# Patient Record
Sex: MALE | Race: WHITE
[De-identification: names, ages, dates, MRNs, and addresses within clinical notes are randomized per-mention and may not be internally consistent; named-entity substitution may affect disease eponyms.]

---

## 2017-10-05 ENCOUNTER — HOSPITAL ENCOUNTER (OUTPATIENT)
Dept: HOSPITAL 53 - M SFHCPLAZ | Age: 37
End: 2017-10-05
Attending: INTERNAL MEDICINE
Payer: COMMERCIAL

## 2017-10-05 DIAGNOSIS — Z00.00: Primary | ICD-10-CM

## 2017-10-05 LAB
ADD MORPHOLOGY?: NO
ALBUMIN SERPL BCG-MCNC: 4.1 GM/DL (ref 3.2–5.2)
ALBUMIN/GLOB SERPL: 1.41 {RATIO} (ref 1–1.93)
ALP SERPL-CCNC: 63 U/L (ref 45–117)
ALT SERPL W P-5'-P-CCNC: 24 U/L (ref 12–78)
ANION GAP SERPL CALC-SCNC: 5 MEQ/L (ref 8–16)
AST SERPL-CCNC: 10 U/L (ref 15–37)
BASOPHILS # BLD AUTO: 0.1 10^3/UL (ref 0–0.2)
BASOPHILS NFR BLD AUTO: 1 % (ref 0–1)
BILIRUB SERPL-MCNC: 0.3 MG/DL (ref 0.2–1)
BUN SERPL-MCNC: 15 MG/DL (ref 7–18)
CALCIUM SERPL-MCNC: 8.5 MG/DL (ref 8.5–10.1)
CHLORIDE SERPL-SCNC: 105 MEQ/L (ref 98–107)
CHOLEST SERPL-MCNC: 169 MG/DL (ref ?–200)
CO2 SERPL-SCNC: 32 MEQ/L (ref 21–32)
CREAT SERPL-MCNC: 1.09 MG/DL (ref 0.7–1.3)
EOSINOPHIL # BLD AUTO: 0.1 10^3/UL (ref 0–0.5)
EOSINOPHIL NFR BLD AUTO: 1.6 % (ref 0–3)
ERYTHROCYTE [DISTWIDTH] IN BLOOD BY AUTOMATED COUNT: 12.1 % (ref 11.5–14.5)
EST. AVERAGE GLUCOSE BLD GHB EST-MCNC: 117 MG/DL (ref 60–110)
GFR SERPL CREATININE-BSD FRML MDRD: > 60 ML/MIN/{1.73_M2} (ref 60–?)
GLUCOSE SERPL-MCNC: 78 MG/DL (ref 70–105)
IMM GRANULOCYTES NFR BLD: 0.3 % (ref 0–0)
LYMPHOCYTES # BLD AUTO: 2.8 10^3/UL (ref 1.5–4.5)
LYMPHOCYTES NFR BLD AUTO: 40.3 % (ref 24–44)
MCH RBC QN AUTO: 30.5 PG (ref 27–33)
MCHC RBC AUTO-ENTMCNC: 32.8 G/DL (ref 32–36.5)
MCV RBC AUTO: 92.9 FL (ref 80–96)
MONOCYTES # BLD AUTO: 0.7 10^3/UL (ref 0–0.8)
MONOCYTES NFR BLD AUTO: 9.4 % (ref 0–5)
NEUTROPHILS # BLD AUTO: 3.3 10^3/UL (ref 1.8–7.7)
NEUTROPHILS NFR BLD AUTO: 47.4 % (ref 36–66)
NRBC BLD AUTO-RTO: 0 % (ref 0–0)
PLATELET # BLD AUTO: 277 10^3/UL (ref 150–450)
POTASSIUM SERPL-SCNC: 4.2 MEQ/L (ref 3.5–5.1)
PROT SERPL-MCNC: 7 GM/DL (ref 6.4–8.2)
SODIUM SERPL-SCNC: 142 MEQ/L (ref 136–145)
TRIGL SERPL-MCNC: 137 MG/DL (ref ?–150)
WBC # BLD AUTO: 7 10^3/UL (ref 4–10)

## 2018-06-14 ENCOUNTER — HOSPITAL ENCOUNTER (OUTPATIENT)
Dept: HOSPITAL 53 - M OPP | Age: 38
Discharge: HOME | End: 2018-06-14
Attending: INTERNAL MEDICINE
Payer: COMMERCIAL

## 2018-06-14 DIAGNOSIS — F41.9: ICD-10-CM

## 2018-06-14 DIAGNOSIS — Z12.11: Primary | ICD-10-CM

## 2018-06-14 DIAGNOSIS — Z80.3: ICD-10-CM

## 2018-06-14 DIAGNOSIS — K21.9: ICD-10-CM

## 2018-06-14 DIAGNOSIS — K44.9: ICD-10-CM

## 2018-06-14 DIAGNOSIS — Z80.0: ICD-10-CM

## 2018-06-14 DIAGNOSIS — Z79.899: ICD-10-CM

## 2018-06-14 DIAGNOSIS — D12.5: ICD-10-CM

## 2018-06-14 DIAGNOSIS — K57.30: ICD-10-CM

## 2018-06-14 PROCEDURE — 45385 COLONOSCOPY W/LESION REMOVAL: CPT

## 2018-06-19 ENCOUNTER — HOSPITAL ENCOUNTER (EMERGENCY)
Dept: HOSPITAL 53 - M ED | Age: 38
Discharge: HOME | End: 2018-06-19
Payer: COMMERCIAL

## 2018-06-19 DIAGNOSIS — Z79.899: ICD-10-CM
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
## 2018-06-19 DIAGNOSIS — K21.9: ICD-10-CM

## 2018-06-19 DIAGNOSIS — S90.31XA: Primary | ICD-10-CM

## 2018-06-19 DIAGNOSIS — F32.9: ICD-10-CM

## 2018-06-19 PROCEDURE — 73610 X-RAY EXAM OF ANKLE: CPT

## 2020-05-28 ENCOUNTER — HOSPITAL ENCOUNTER (OUTPATIENT)
Dept: HOSPITAL 53 - M SFHCPLAZ | Age: 40
End: 2020-05-28
Attending: INTERNAL MEDICINE
Payer: COMMERCIAL

## 2020-05-28 DIAGNOSIS — Z13.1: Primary | ICD-10-CM

## 2020-05-28 DIAGNOSIS — F41.9: ICD-10-CM

## 2020-05-28 LAB
APPEARANCE UR: CLEAR
BACTERIA UR QL AUTO: NEGATIVE
BILIRUB UR QL STRIP.AUTO: NEGATIVE
CHOLEST SERPL-MCNC: 187 MG/DL (ref ?–200)
CHOLEST/HDLC SERPL: 3.34 {RATIO} (ref ?–5)
CREAT UR-MCNC: 179 MG/DL
EST. AVERAGE GLUCOSE BLD GHB EST-MCNC: 108 MG/DL (ref 60–110)
GLUCOSE UR QL STRIP.AUTO: NEGATIVE MG/DL
HDLC SERPL-MCNC: 56 MG/DL (ref 40–?)
HGB UR QL STRIP.AUTO: NEGATIVE
KETONES UR QL STRIP.AUTO: NEGATIVE MG/DL
LDLC SERPL CALC-MCNC: 97 MG/DL (ref ?–100)
LEUKOCYTE ESTERASE UR QL STRIP.AUTO: NEGATIVE
MICROALBUMIN UR-MCNC: 7.5 MG/L
MICROALBUMIN/CREAT UR: 4.1 MCG/MG (ref 0–30)
MUCOUS THREADS URNS QL MICRO: (no result)
NITRITE UR QL STRIP.AUTO: NEGATIVE
NONHDLC SERPL-MCNC: 131 MG/DL
PH UR STRIP.AUTO: 5 UNITS (ref 5–9)
PROT UR QL STRIP.AUTO: NEGATIVE MG/DL
RBC # UR AUTO: 0 /HPF (ref 0–3)
SP GR UR STRIP.AUTO: 1.02 (ref 1–1.03)
SQUAMOUS #/AREA URNS AUTO: 0 /HPF (ref 0–6)
TRIGL SERPL-MCNC: 169 MG/DL (ref ?–150)
TSH SERPL DL<=0.005 MIU/L-ACNC: 1.85 UIU/ML (ref 0.36–3.74)
UROBILINOGEN UR QL STRIP.AUTO: 2 MG/DL (ref 0–2)
WBC #/AREA URNS AUTO: 1 /HPF (ref 0–3)

## 2020-12-17 ENCOUNTER — HOSPITAL ENCOUNTER (OUTPATIENT)
Dept: HOSPITAL 53 - M SFHCPLAZ | Age: 40
End: 2020-12-17
Attending: INTERNAL MEDICINE
Payer: COMMERCIAL

## 2020-12-17 DIAGNOSIS — R82.90: ICD-10-CM

## 2020-12-17 DIAGNOSIS — Z00.00: Primary | ICD-10-CM

## 2020-12-17 LAB
APPEARANCE UR: CLEAR
BACTERIA UR QL AUTO: NEGATIVE
BILIRUB UR QL STRIP.AUTO: NEGATIVE
GLUCOSE UR QL STRIP.AUTO: NEGATIVE MG/DL
HCV AB SER QL: 0.1 INDEX (ref ?–0.8)
HGB UR QL STRIP.AUTO: NEGATIVE
HIV 1+2 AB+HIV1 P24 AG SERPL QL IA: NEGATIVE
KETONES UR QL STRIP.AUTO: NEGATIVE MG/DL
LEUKOCYTE ESTERASE UR QL STRIP.AUTO: NEGATIVE
MUCOUS THREADS URNS QL MICRO: (no result)
NITRITE UR QL STRIP.AUTO: NEGATIVE
PH UR STRIP.AUTO: 5 UNITS (ref 5–9)
PROT UR QL STRIP.AUTO: NEGATIVE MG/DL
RBC # UR AUTO: 0 /HPF (ref 0–3)
SP GR UR STRIP.AUTO: 1.02 (ref 1–1.03)
SQUAMOUS #/AREA URNS AUTO: 0 /HPF (ref 0–6)
UROBILINOGEN UR QL STRIP.AUTO: 0.2 MG/DL (ref 0–2)
WBC #/AREA URNS AUTO: 0 /HPF (ref 0–3)

## 2021-10-18 ENCOUNTER — HOSPITAL ENCOUNTER (EMERGENCY)
Dept: HOSPITAL 53 - M ED | Age: 41
Discharge: LEFT BEFORE DISCHARGE | End: 2021-10-18
Payer: COMMERCIAL

## 2021-10-18 VITALS — SYSTOLIC BLOOD PRESSURE: 149 MMHG | DIASTOLIC BLOOD PRESSURE: 93 MMHG

## 2021-10-18 VITALS — HEIGHT: 69 IN | BODY MASS INDEX: 31.74 KG/M2 | WEIGHT: 214.29 LBS

## 2021-10-18 DIAGNOSIS — Z53.29: Primary | ICD-10-CM

## 2021-10-18 NOTE — CCD
Continuity of Care Document (CCD)

                             Created on: 08/10/2021



Maverick Dee

External Reference #: [id number]

: 1980

Sex: Male



Demographics





                          Address                   [address] [place]  [address]

 

                          Home Phone                [phone]

 

                          Preferred Language        Unknown

 

                          Marital Status            Unknown

 

                          Worship Affiliation     Unknown

 

                          Race                      White

 

                          Ethnic Group              Not  or 





Author





                          Author                    [facility]

 

                          Organization              Unknown

 

                          Address                   [address] [place] [address]  [address]



 

                          Phone                     [phone]







Care Team Providers





                    Care Team Member Name Role                Phone

 

                    [facility]                [phone]







Problems





                                        Description

 

                                        No Information Available







Social History





                Type            Date            Description     Comments

 

                Birth Sex                       Unknown          

 

                ETOH Use                        Occasionally consumes alcohol  

 

                Tobacco Use     Start: Unknown End: Unknown Patient is a former 

smoker currently 

trying to quit, 9 days since last cigarette.  

 

                Tobacco Use     Start: Unknown  The Patient Has Never Vaped  







Allergies, Adverse Reactions, Alerts





                                        Description

 

                                        No Known Drug Allergies







Medications





           Active Medications SIG        Qnty       Indications Ordering Provide

r Date

 

                                        Amoxicillin/Clavulanate Potassium       

              875-125mg Tablets         

                1 tab by mouth twice a day for 7 days 14tabs          J20.9     

      Abilio Rodriguez JR., M.D.                               08/10/2021

 

                          Prednisone                     20mg Tablets           

        1 tab by mouth 

twice a day for 4 days 8tabs           J20.9           YANELIS Blas JR. 08/10/2021

 

                                        Albuterol Sulfate HFA                   

  108(90Base) mcg/Act Aerosol           

                2 puffs inhaled every 4-6 hours as needed for sob/wheeze 8.500gm

         J20.9           

Abilio Rodriguez JR., M.D.            08/10/2021

 

           Lexapro                                     Unknown    

 

           Prilosec OTC                                  Unknown    







Immunizations





                                        Description

 

                                        No Information Available







Vital Signs





                Date            Vital           Result          Comment

 

                08/10/2021 10:07am BP Systolic     106 mmHg         

 

                    BP Diastolic        72 mmHg              

 

                    Heart Rate          68 /min              

 

                    Respiratory Rate    18 /min              

 

                    O2 % BldC Oximetry  98 %                 

 

                    Body Temperature    98.6 F             

 

                    Weight              221.00 lb            

 

                    Height              69 inches           5'9"

 

                    BMI (Body Mass Index) 32.6 kg/m2           

 

                    Pain Level          3                    







Results





                                        Description

 

                                        No Information Available







Procedures





                Date            Code            Description     Status

 

                08/10/2021      85171           Office/Outpatient New Low MDM 30

-44 Minutes Completed







Medical Devices





                                        Description

 

                                        No Information Available







Encounters





           Type       Date       Location   Provider   Dx         Diagnosis

 

           Office Visit 08/10/2021  9:10a Main Office KANG Kenyon J2

0.9      Acute 

bronchitis, unspecified

 

                          Z20.828                   Contact w and exposure to ot

h viral communicable diseases







Assessments





                Date            Code            Description     Provider

 

                08/10/2021      J20.9           Acute bronchitis, unspecified KANG Santos

 

                    08/10/2021          Z20.828             Contact with and (arauz

spected) exposure to other viral 

communicable diseases                   KANG Kenyon







Plan of Treatment





No Information Available



Functional Status





                                        Description

 

                                        No Information Available







Mental Status





                                        Description

 

                                        No Information Available







Referrals





                                        Description

 

                                        No Information Available

## 2021-10-18 NOTE — CCD
Continuity of Care Document (CCD)

                             Created on: 08/10/2021



Maverick Dee

External Reference #: [id number]

: 1980

Sex: Male



Demographics





                          Address                   [address] [place]  [address]

 

                          Home Phone                [phone]

 

                          Preferred Language        Unknown

 

                          Marital Status            Unknown

 

                          Yazidism Affiliation     Unknown

 

                          Race                      White

 

                          Ethnic Group              Not  or 





Author





                          Author                    [facility]

 

                          Organization              Unknown

 

                          Address                   [address] [place] [address]  [address]



 

                          Phone                     [phone]







Care Team Providers





                    Care Team Member Name Role                Phone

 

                    [facility]                [phone]







Problems





                                        Description

 

                                        No Information Available







Social History





                Type            Date            Description     Comments

 

                Birth Sex                       Unknown          

 

                ETOH Use                        Occasionally consumes alcohol  

 

                Tobacco Use     Start: Unknown End: Unknown Patient is a former 

smoker currently 

trying to quit, 9 days since last cigarette.  

 

                Tobacco Use     Start: Unknown  The Patient Has Never Vaped  







Allergies, Adverse Reactions, Alerts





                                        Description

 

                                        No Known Drug Allergies







Medications





           Active Medications SIG        Qnty       Indications Ordering Provide

r Date

 

                                        Amoxicillin/Clavulanate Potassium       

              875-125mg Tablets         

                1 tab by mouth twice a day for 7 days 14tabs          J20.9     

      Abilio Rodriguez JR., M.D.                               08/10/2021

 

                          Prednisone                     20mg Tablets           

        1 tab by mouth 

twice a day for 4 days 8tabs           J20.9           YANELIS Blas JR. 08/10/2021

 

                                        Albuterol Sulfate HFA                   

  108(90Base) mcg/Act Aerosol           

                2 puffs inhaled every 4-6 hours as needed for sob/wheeze 8.500gm

         J20.9           

Abilio Rodriguez JR., M.D.            08/10/2021

 

           Lexapro                                     Unknown    

 

           Prilosec OTC                                  Unknown    







Immunizations





                                        Description

 

                                        No Information Available







Vital Signs





                Date            Vital           Result          Comment

 

                08/10/2021 10:07am BP Systolic     106 mmHg         

 

                    BP Diastolic        72 mmHg              

 

                    Heart Rate          68 /min              

 

                    Respiratory Rate    18 /min              

 

                    O2 % BldC Oximetry  98 %                 

 

                    Body Temperature    98.6 F             

 

                    Weight              221.00 lb            

 

                    Height              69 inches           5'9"

 

                    BMI (Body Mass Index) 32.6 kg/m2           

 

                    Pain Level          3                    







Results





                                        Description

 

                                        No Information Available







Procedures





                Date            Code            Description     Status

 

                08/10/2021      22117           Office/Outpatient New Low MDM 30

-44 Minutes Completed







Medical Devices





                                        Description

 

                                        No Information Available







Encounters





           Type       Date       Location   Provider   Dx         Diagnosis

 

           Office Visit 08/10/2021  9:10a Main Office KANG Kenyon J2

0.9      Acute 

bronchitis, unspecified

 

                          Z20.828                   Contact w and exposure to ot

h viral communicable diseases







Assessments





                Date            Code            Description     Provider

 

                08/10/2021      J20.9           Acute bronchitis, unspecified KANG Santos

 

                    08/10/2021          Z20.828             Contact with and (arauz

spected) exposure to other viral 

communicable diseases                   KANG Kenyon







Plan of Treatment





No Information Available



Functional Status





                                        Description

 

                                        No Information Available







Mental Status





                                        Description

 

                                        No Information Available







Referrals





                                        Description

 

                                        No Information Available

## 2021-10-18 NOTE — CCD
Continuity of Care Document (CCD)

                             Created on: 2021



Maverick Dee

External Reference #: [id number]

: 1980

Sex: Male



Demographics





                          Address                   [address] [place]  [address]

 

                          Home Phone                [phone]

 

                          Preferred Language        Unknown

 

                          Marital Status            Unknown

 

                          Sikhism Affiliation     Unknown

 

                          Race                      White

 

                          Ethnic Group              Not  or 





Author





                          Author                    [facility]

 

                          Organization              Unknown

 

                          Address                   [address], [address]

[place]  [address]



 

                          Phone                     [phone]







Care Team Providers





                    Care Team Member Name Role                Phone

 

                    Tony Licea M.D. [facility]                [phone]

 

                    Wagner Warren D.O. AUT[facility]                [phone]







Problems





                                        Description

 

                                        No Active Problems







Social History





                Type            Date            Description     Comments

 

                Birth Sex                       Unknown          

 

                ETOH Use                        2-6 drinks 1-3 times per week  

 

                Tobacco Use     Start: Unknown  Non Smoker       







Allergies, Adverse Reactions, Alerts





                                        Description

 

                                        No Known Drug Allergies







Medications





           Active Medications SIG        Qnty       Indications Ordering Provide

r Date

 

                          Sutab                     7890-730-867ym Tablets      

             take tablets 

as directed per doctor for bowel prep. 1kit            Z12.11          Rusty colón MD 2021

 

                          Dulcolax                     5mg Tablets DR           

        take 4 tabs by 

mouth prior to procedure per instructions. 4tabs           Z12.11          Rusty Araujo MD 

2021

 

                          Pantoprazole Sodium                     20mg Tablets D

R                   1/2 

tablet every other day                                 Unknown         

0

 

                          Citalopram Hydrobromide                     10mg Table

ts                   Daily

                                                Unknown         







Immunizations





                                        Description

 

                                        No Information Available







Vital Signs





                Date            Vital           Result          Comment

 

                2021  8:07am BP Systolic     120 mmHg         

 

                    BP Diastolic        82 mmHg              

 

                    Height              69 inches           5'9"

 

                    Weight              218.00 lb            

 

                    BMI (Body Mass Index) 32.2 kg/m2           

 

                    Ideal Body Weight   160 lb               

 

                    Weight              98.885 kg            

 

                    BSA (Body Surface Area) 2.14 m2              

 

                2018  1:06pm BP Systolic     108 mmHg         

 

                    BP Diastolic        80 mmHg              

 

                    Height              69 inches           5'9"

 

                    Weight              219.00 lb            

 

                    BMI (Body Mass Index) 32.3 kg/m2           

 

                    Ideal Body Weight   160 lb               

 

                    Weight              99.338 kg            

 

                    BSA (Body Surface Area) 2.15 m2              







Results





                                        Description

 

                                        No Information Available







Procedures





                                        Description

 

                                        No Information Available







Medical Devices





                                        Description

 

                                        No Information Available







Encounters





                                        Description

 

                                        No Information Available







Assessments





                Date            Code            Description     Provider

 

                2021      Z12.11          Encounter for screening for rosa

gnant neoplasm of colon 

Destineecheryle ScottALMA

 

                2021      Z86.010         Personal history of colonic poly

ps Destinee A ALMA Scott

 

                2021      Z80.0           Family history of malignant neop

lasm of digestive organs 

Destineecheryle AcostaALMA lovett

 

                2021      K21.9           Gastro-esophageal reflux disease

 without esophagitis Destinee A 

ALMA Scott







Plan of Treatment

2021 - Destinee A ALMA Scott* Z12.11 Encounter for screening for 
  malignant neoplasm of colon

* Z86.010 Personal history of colonic polyps

* Z80.0 Family history of malignant neoplasm of digestive organs

* K21.9 Gastro-esophageal reflux disease without esophagitis

* * New Medication:* Sutab 8436-605-527 mg

* Dulcolax 5 mg



* New Orders:* Colonoscopy, Ordered: 21



* Comments:* Will arrange for upper endoscopy and colonoscopy. Reviewed risks 
  and benefits of the procedures, as well as other options, with the patient. 
  Prep for this procedure was discussed with patient, including risks and side 
  effects associated with the prep. Patient verbalized understanding of all of 
  the above and is in agreement to proceed. Patient will seek medical attention 
  for any acute changes. Will monitor.



* Follow up:* As scheduled, sooner if needed.









Functional Status





                                        Description

 

                                        No Information Available







Mental Status





                                        Description

 

                                        No Information Available







Referrals





                                        Description

 

                                        No Information Available

## 2021-10-18 NOTE — CCD
Summarization of Episode Note

                             Created on: 2021



STEPHY CHRISTINA HILARIO

External Reference #: [id number]

: 1980

Sex: Male



Demographics





                          Address                   [address] [place]  [address]

 

                          Home Phone                [phone]

 

                          Preferred Language        Unknown

 

                          Marital Status            Unknown

 

                          Nondenominational Affiliation     Unknown

 

                          Race                      White

 

                          Ethnic Group              Not  or 





Author





                          Author                    [facility]

ems

 

                          Organization              [facility]

ems

 

                          Address                   Unknown

 

                          Phone                     Unavailable







Support





                Name            Relationship    Address         Phone

 

                    CHRISTINA KEYES                [address], [place]  [address] [phone]

 

                    RayaAna rincon          [employer]                [address] [place]  [address]                  [phone]







Care Team Providers





                    Care Team Member Name Role                Phone

 

                    Wagner Warren   Unavailable         [phone]







PROBLEMS





          Type      Condition ICD9-CM Code NKV09-AQ Code Onset Dates Condition S

tatus W/U 

Status              Risk                SNOMED Code         Notes

 

       Problem Hiatal hernia 553.3                Active confirmed        317376

09  

 

       Problem Anxiety 300.00               Active confirmed        73466179  

 

          Problem   Diaphragmatic hernia without obstruction or gangrene        

   K44.9               Active    

confirmed                               [id number]             

 

       Problem Cardiac risk counseling V65.49               Active confirmed    

    [id number]  

 

          Problem   Melanocytic nevi of right lower limb, including hip         

  D22.71              Active    

confirmed                               [id number]            

 

          Problem   Melanocytic nevi of left lower limb, including hip          

 D22.72              Active    

confirmed                               [id number]      

 

       Problem Anxiety with depression        F41.8         Active confirmed    

    058861284  

 

       Problem BMI 31.0-31.9,adult        Z68.31        Active confirmed        

823401533  

 

       Problem Anxiety        F41.9         Active confirmed        30737843  

 

       Problem Anorgasmia of male        F52.32        Active confirmed        8

4170129  

 

       Problem Cyst of skin        L72.9         Active confirmed        3813953

01  

 

       Problem BMI 30.0-30.9,adult        Z68.30        Active confirmed        

270811510  

 

       Problem Hidradenitis suppurativa        L73.2         Active confirmed   

     96746945  

 

       Problem Lentigines        L81.4         Active confirmed        498162997

  

 

           Problem    Melanocytic nevi of left upper limb, including shoulder   

         D22.62                

Active          confirmed                       [id number]  

 

           Problem    Melanocytic nevi of right upper limb, including shoulder  

          D22.61                

Active          confirmed                       169277765        

 

       Problem Melanocytic nevi of trunk        D22.5         Active confirmed  

      650017307  

 

       Problem Folliculitis        L73.9         Active confirmed        1206843

6  

 

                Problem         Gastroesophageal reflux disease, esophagitis pre

sence not specified                 

K21.9                 Active     confirmed             853527308   

 

                          Problem                   Gastroesophageal reflux dise

ase, unspecified whether esophagitis present

                K21.9           Active  confirmed         510487164  

 

       Problem Anxiety disorder, unspecified        F41.9         Active confirm

ed        449992263  

 

                          Problem                   Major depressive disorder in

 partial remission, unspecified whether 

recurrent         F32.4           Active  confirmed         39398250  

 

        Problem Melanocytic nevi of scalp and neck         D22.4           Activ

e  confirmed         

547043293                                

 

       Problem Cherry angioma        D18.01        Active confirmed        10175

01  

 

       Problem Ephelides        L81.2         Active confirmed        194771549 

 

 

       Problem Dermatofibroma        D23.9         Active confirmed        62976

6000  







ALLERGIES

No Known Allergies



ENCOUNTERS from 1980 to 2021





             Encounter    Location     Date         Provider     Diagnosis

 

                          [facility] Resident         [address] 

[phone] [place] [address]

                            Wagner Warren      







IMMUNIZATIONS





                Vaccine         Route           Administration Date Status

 

                Influenza 18 yrs & older Flublok IM Intramuscular Dec 07, 2020  

  Administered

 

                Influenza 18 yrs & older Flublok IM Intramuscular 2020

  Administered

 

                Pneumococcal Adult 0.5mL Pneumovax 23 IM Intramuscular 2020  

Administered

 

                Influenza 6mo & up Fluzone IM Intramuscular Oct 18, 2018    Admi

nistered

 

                Influenza 6mo & up Fluzone IM Intramuscular Oct 05, 2017    Admi

nistered







SOCIAL HISTORY

Tobacco Use:



                    Social History Observation Description         Date

 

                    Details (start date - stop date) Current Smoker       



Sex Assigned At Birth:



                          Social History Observation Description

 

                          Sex Assigned At Birth     Unknown



Education:



                    Question            Answer              Notes

 

                    Level of Education: College              



Audit



                    Question            Answer              Notes

 

                    Total Score:        1                    

 

                    Interpretation:     Alcohol Education    



Language:



                    Question            Answer              Notes

 

                    Languages spoken:   English              



Pentecostal:



                    Question            Answer              Notes

 

                    Pentecostal                                No Anabaptist beliefs

 that would impact health care.



Sexual Hx:



                    Question            Answer              Notes

 

                    Had sex in the last 12 months (vaginal, oral, or anal)? Yes 

                 

 

                    Have you ever had an STD? No                   

 

                    with                Women only           

 

                    Use protection?     No                   



Drug and Alcohol



                    Question            Answer              Notes

 

                    Total Score:        0                    

 

                    Interpretation:     No problems reported  



Alcohol Screening:



                    Question            Answer              Notes

 

                    Did you have a drink containing alcohol in the past year? Ye

s                  

 

                    Points              7                    

 

                    Interpretation      Positive             

 

                          How often did you have six or more drinks on one occas

ion in the past year? 

Monthly (2 points)                       

 

                                        How many drinks did you have on a typica

l day when you were drinking in the past

year?                     7 to 9 (3 points)          

 

                          How often did you have a drink containing alcohol in t

he past year? Two to four 

times a month (2 points)                 



BMI Care Goal Follow-Up



                    Question            Answer              Notes

 

                    Above Normal BMI Follow-Up Lifestyle education regarding die

t  



Tobacco Use:



                    Question            Answer              Notes

 

                    Are you a:          current smoker       







REASON FOR REFERRAL

No Information



VITAL SIGNS

No information



MEDICATIONS





           Medication SIG (Take, Route, Frequency, Duration) Notes      Start Da

te End Date   

Status

 

                          Doxycycline Hyclate 100 MG 1 capsule Orally Twice a da

y with food and water for 

30 days                         27 Oct, 2020                    Not-Taking

 

                          Clindamycin Phosphate 1 % 1 application Externally Onc

e a day to areas with acne

like bumps after washing with Hibiclens for 30 days                 27 Oct, 2020

                    Active

 

           Lexapro 10 mg 1 cap(s) orally daily for 30 days            15 Mauro, 20

15            Not-Taking

 

           Protonix 20 MG 1 tablet Orally Once a day for 30 day(s)                        Active

 

                Citalopram Hydrobromide 10 MG 1 tablet Orally Once a day for 90 

day(s)                                                                 Active







PROCEDURES

No Information



RESULTS

No Results



REASON FOR VISIT

no showed



MEDICAL (GENERAL) HISTORY





                    Type                Description         Date

 

                    Medical History     Generalized anxiety disorder  

 

                    Medical History     Gastroesophageal reflux disease  

 

                    Surgical History    tonsillectomy & adenoids  

 

                    Surgical History    pilonial cyst       

 

                    Surgical History    EGD                 2015

 

                    Hospitalization History related to surgery   

 

                    Hospitalization History Admitted dehydration  

 

                    Hospitalization History Admitted MH          







Goals Section

No Information



Health Concerns

No Information



MEDICAL EQUIPMENT

No Information



MENTAL STATUS

No Information



FUNCTIONAL STATUS

No Information



ASSESSMENTS

No Information



PLAN OF TREATMENT

Medication



                Medication Name Sig             Start Date      Stop Date

 

                    Citalopram Hydrobromide 10 MG 1 tablet Orally Once a day for

 90 day(s)                                      

 

                Protonix 20 MG  1 tablet Orally Once a day for 30 day(s)      



Next Appt



                                        Details

 

                                        Provider Name:Michelle Loyd, 2021-10-

29 08:15:00 AM, [address] [place] [address], 

[phone], [place], [place], [facility], [phone]







Insurance Providers





             Payer Name   Payer Address Payer Phone  Insured Name Patient Relati

onship to 

Insured                   Coverage Start Date       Coverage End Date

 

                [facility] [address]  [facility] [phone]

[phone]    

CHRISTINA KEYES        self

## 2021-10-18 NOTE — CCD
Continuity of Care Document (CCD)

                             Created on: 08/10/2021



Maverick Dee

External Reference #: [id number]

: 1980

Sex: Male



Demographics





                          Address                   [address] [place]  [address]

 

                          Home Phone                [phone]

 

                          Preferred Language        Unknown

 

                          Marital Status            Unknown

 

                          Presybeterian Affiliation     Unknown

 

                          Race                      White

 

                          Ethnic Group              Not  or 





Author





                          Author                    [facility]

 

                          Organization              Unknown

 

                          Address                   [address] [place] [address]  [address]



 

                          Phone                     [phone]







Care Team Providers





                    Care Team Member Name Role                Phone

 

                    [facility]                [phone]







Problems





                                        Description

 

                                        No Information Available







Social History





                Type            Date            Description     Comments

 

                Birth Sex                       Unknown          

 

                ETOH Use                        Occasionally consumes alcohol  

 

                Tobacco Use     Start: Unknown End: Unknown Patient is a former 

smoker currently 

trying to quit, 9 days since last cigarette.  

 

                Tobacco Use     Start: Unknown  The Patient Has Never Vaped  







Allergies, Adverse Reactions, Alerts





                                        Description

 

                                        No Known Drug Allergies







Medications





           Active Medications SIG        Qnty       Indications Ordering Provide

r Date

 

                                        Amoxicillin/Clavulanate Potassium       

              875-125mg Tablets         

                1 tab by mouth twice a day for 7 days 14tabs          J20.9     

      Abilio Rodriguez JR., M.D.                               08/10/2021

 

                          Prednisone                     20mg Tablets           

        1 tab by mouth 

twice a day for 4 days 8tabs           J20.9           YANELIS Blas JR. 08/10/2021

 

                                        Albuterol Sulfate HFA                   

  108(90Base) mcg/Act Aerosol           

                2 puffs inhaled every 4-6 hours as needed for sob/wheeze 8.500gm

         J20.9           

Abilio Rodriguez JR., M.D.            08/10/2021

 

           Lexapro                                     Unknown    

 

           Prilosec OTC                                  Unknown    







Immunizations





                                        Description

 

                                        No Information Available







Vital Signs





                Date            Vital           Result          Comment

 

                08/10/2021 10:07am BP Systolic     106 mmHg         

 

                    BP Diastolic        72 mmHg              

 

                    Heart Rate          68 /min              

 

                    Respiratory Rate    18 /min              

 

                    O2 % BldC Oximetry  98 %                 

 

                    Body Temperature    98.6 F             

 

                    Weight              221.00 lb            

 

                    Height              69 inches           5'9"

 

                    BMI (Body Mass Index) 32.6 kg/m2           

 

                    Pain Level          3                    







Results





                                        Description

 

                                        No Information Available







Procedures





                Date            Code            Description     Status

 

                08/10/2021      25837           Office/Outpatient New Low MDM 30

-44 Minutes Completed







Medical Devices





                                        Description

 

                                        No Information Available







Encounters





           Type       Date       Location   Provider   Dx         Diagnosis

 

           Office Visit 08/10/2021  9:10a Main Office KANG Kenyon J2

0.9      Acute 

bronchitis, unspecified

 

                          Z20.828                   Contact w and exposure to ot

h viral communicable diseases







Assessments





                Date            Code            Description     Provider

 

                08/10/2021      J20.9           Acute bronchitis, unspecified KANG Santos

 

                    08/10/2021          Z20.828             Contact with and (arauz

spected) exposure to other viral 

communicable diseases                   KANG Kenyon







Plan of Treatment





No Information Available



Functional Status





                                        Description

 

                                        No Information Available







Mental Status





                                        Description

 

                                        No Information Available







Referrals





                                        Description

 

                                        No Information Available

## 2021-10-18 NOTE — CCD
Summarization Of Episode

                             Created on: 10/18/2021



CHRISTINA KEYES

External Reference #: [id number]

: 1980

Sex: Undifferentiated



Demographics





                          Address                   [address] [place]  [address]

 

                          Home Phone                [phone]

 

                          Preferred Language        English

 

                          Marital Status            Unknown

 

                          Sabianism Affiliation     Unknown

 

                          Race                      Unknown

 

                          Ethnic Group              Not  or 





Author





                          Author                    [facility]

 

                          Organization              [facility]

 

                          Address                   Unknown

 

                          Phone                     Unavailable







Support





                Name            Relationship    Address         Phone

 

                    INDRVCTL            Next Of [facility] [address]

[place]  [address]                 [phone]

 

                    [place]  [address]                  [phone]

 

                    [facility] Next Of Kin         [address]

[place]  [address]                 [phone]

 

                    SHARAD ADAMS         Next Of [facility]         [address] [place]  [address]                  [phone]

 

                    INDIANRIV*          Next Of Kin         [address]

[place]  [address]                 [phone]

 

                              Next Of Kin     Unknown         Unavailable

 

                    POONAM KEYES    Next Of Kin         [address] [place]  [address]                  [phone]

 

                    MARILEE KEYES   Next Of Kin         [address] [place]  [address]                      [phone]

 

                    Sharad Adams         ECON                [address] [place]  [address]                  [phone]







Care Team Providers





                    Care Team Member Name Role                Phone

 

                    LADAN HARPM PA-C Unavailable         Unavailable

 

                    DJOUINI  ZAK PA-C Unavailable         Unavailable

 

                    FRANCISCOOUINI,  ZAK PA-C Unavailable         Unavailable

 

                    DJOUINI  ZAK PA-C Unavailable         Unavailable

 

                    DJOUINI,  ZAK PA-C Unavailable         Unavailable

 

                    DJOUINI,  ZAK PA-C Unavailable         Unavailable

 

                    RUTHANNGABRIELA TONY PA Unavailable         Unavailable

 

                    RUTHANNGABRIELA PA Unavailable         Unavailable

 

                    RUTHANNGABRIELA TONY PA Unavailable         Unavailable

 

                    RUTHANNGABRIELA TONY PA Unavailable         Unavailable

 

                    RUTHANNGABRIELA PA Unavailable         Unavailable

 

                    RUTHANNGABRIELA PA Unavailable         Unavailable

 

                    RUTHANNRAMYAGABRIELA PA Unavailable         Unavailable

 

                    RUTHANNGABRIELA PA Unavailable         Unavailable

 

                    RUTHANN,  GABRIELA PA Unavailable         Unavailable

 

                    RUTHANNCARMINAHAN PA Unavailable         Unavailable

 

                    RUTHANNRAMYAGABRIELA PA Unavailable         Unavailable

 

                    RUTHANN,  GABRIELA PA Unavailable         Unavailable

 

                    RUTHANN,  GABRIELA PA Unavailable         Unavailable

 

                    RUTHANN,  GABRIELA PA Unavailable         Unavailable

 

                    RUTHANN,  GABRIELA PA Unavailable         Unavailable

 

                    RUTHANN,  GABRIELA PA Unavailable         Unavailable

 

                    RUTHANN,  GABRIELA PA Unavailable         Unavailable

 

                    RUTHANN,  GABRIELA PA Unavailable         Unavailable

 

                    RUTHANN,  GABRIELA PA Unavailable         Unavailable

 

                    RUTHANN,  GABRIELA PA Unavailable         Unavailable

 

                    RUTHANN,  GABRIELA PA Unavailable         Unavailable

 

                    RUTHANN,  GABRIELA PA Unavailable         Unavailable

 

                    RUTHANN,  GABRIELA PA Unavailable         Unavailable

 

                    RUTHANN,  GABRIELA PA Unavailable         Unavailable

 

                    RUTHANN,  GABRIELA PA Unavailable         Unavailable

 

                    RUTHANN,  GABRIELA PA Unavailable         Unavailable

 

                    RUTHANN,  GABRIELA PA Unavailable         Unavailable

 

                    RUTHANN,  GABRIELA PA Unavailable         Unavailable

 

                    RUTHANN,  GABRIELA PA Unavailable         Unavailable

 

                    RUTHANN,  GABRIELA PA Unavailable         Unavailable

 

                    RUTHANN,  GABRIELA PA Unavailable         Unavailable

 

                    RUTHANN,  GABRIELA PA Unavailable         Unavailable

 

                    RUTHANN,  GABRIELA PA Unavailable         Unavailable

 

                    RUTHANN,  GABRIELA PA Unavailable         Unavailable

 

                    RUTHANN,  GABRIELA PA Unavailable         Unavailable

 

                    RUTHANN,  GABRIELA PA Unavailable         Unavailable

 

                    Rydberg,  Leyda PA Unavailable         Unavailable

 

                    Rydberg,  Leyda PA Unavailable         Unavailable

 

                    Rydberg,  Leyda PA Unavailable         Unavailable

 

                    Rydberg,  Leyda PA Unavailable         Unavailable

 

                    Rydberg,  Leyda PA Unavailable         Unavailable

 

                    Rydberg,  Leyda PA Unavailable         Unavailable

 

                    Rydberg,  Leyda PA Unavailable         Unavailable

 

                    Rydberg,  Leyda PA Unavailable         Unavailable

 

                    Rydberg,  Leyda PA Unavailable         Unavailable

 

                    Rydberg,  Leyda PA Unavailable         Unavailable

 

                    Rydberg,  Leyda PA Unavailable         Unavailable

 

                    Rydberg,  Leyda PA Unavailable         Unavailable

 

                    Rydberg,  Leyda PA Unavailable         Unavailable

 

                    Rydberg,  Leyda PA Unavailable         Unavailable

 

                    Rydberg,  Leyda PA Unavailable         Unavailable

 

                    Rydberg,  Leyda PA Unavailable         Unavailable

 

                    Rydberg,  Leyda PA Unavailable         Unavailable

 

                    Rydberg,  Leyda PA Unavailable         Unavailable

 

                    Rydberg,  Leyda PA Unavailable         Unavailable

 

                    Rydberg,  Leyda PA Unavailable         Unavailable

 

                    Rydberg,  Leyda PA Unavailable         Unavailable

 

                    Rydberg,  Leyda PA Unavailable         Unavailable

 

                    Rydberg,  Leyda PA Unavailable         Unavailable



                                  



Re-disclosure Warning

          The records that you are about to access may contain information from 
federally-assisted alcohol or drug abuse programs. If such information is 
present, then the following federally mandated warning applies: This information
has been disclosed to you from records protected by federal confidentiality 
rules (42 CFR part 2). The federal rules prohibit you from making any further 
disclosure of this information unless further disclosure is expressly permitted 
by the written consent of the person to whom it pertains or as otherwise 
permitted by 42 CFR part 2. A general authorization for the release of medical 
or other information is NOT sufficient for this purpose. The Federal rules 
restrict any use of the information to criminally investigate or prosecute any 
alcohol or drug abuse patient.The records that you are about to access may 
contain highly sensitive health information, the redisclosure of which is 
protected by Article 27-F of the [facility] Public Health law. If you 
continue you may have access to information: Regarding HIV / AIDS; Provided by 
facilities licensed or operated by the [facility] Office of Mental Health; 
or Provided by the [facility] Office for People With Developmental 
Disabilities. If such information is present, then the following [place] 
mandated warning applies: This information has been disclosed to you from 
confidential records which are protected by state law. State law prohibits you 
from making any further disclosure of this information without the specific 
written consent of the person to whom it pertains, or as otherwise permitted by 
law. Any unauthorized further disclosure in violation of state law may result in
a fine or alf sentence or both. A general authorization for the release of 
medical or other information is NOT sufficient authorization for further disc
losure.                                                                         
    



Family History

          



             Family Member Name Family Member Gender Family Member Status Date o

f Status 

Description                             Data Source(s)

 

           Unknown    Unknown    Problem                          [facility] ([facility] )



                                                                                
       



Encounters

          



           Encounter  Providers  Location   Date       Indications Data Source(s

)

 

                Outpatient      Attender: ZAK HARP PA-C                 10

/ 08:49:00 AM EDT - 

10/18/2021 08:49:00 AM EDT                           [facility]

 

                Unknown                         [address]

[place] [address] 2021 12:00:00 AM 

EDT                                                 eCW1 ([facility])

 

                Outpatient      Attender: GABRIELA cook 08/10/2021 

09:10:00 AM EDT                                     [facility] ([facility]

 

                Unknown                         [address]

[place] [address] 2021 12:00:00 AM 

EDT                                                 eCW1 ([facility])

 

                Unknown                         [address]

[place] [address] 2021 12:00:00 AM 

EDT                                                 eCW1 ([facility])

 

                Unknown                         [address]

[place] [address] 2021 12:00:00 AM 

EDT                                                 eCW1 ([facility])

 

           Outpatient Attender: Leyda FERNANDEZ            2021 11:29:00

 AM [facility]

 

           Outpatient Attender: Leyda FERNANDEZ            2021 01:15:00

 PM [facility]

 

                                        Admission cancelled. Disregard status an

d admitted date. 

 

                Unknown                         [address]

[place] [address] 2020 12:00:00 AM 

EST                                                 eCW1 ([facility])

 

                Outpatient                      [address]

[place] [address] 2020 12:00:00 AM

EST                                                 eCW1 ([facility])

 

                Unknown                         [address]

[place] [address] 2020 12:00:00 AM 

EST                                                 eCW1 ([facility])

 

                Outpatient                      [address]

[place] [address] 10/27/2020 12:00:00 AM

EDT                                                 eCW1 ([facility])



                                                                                
                                                                                
                                    



Immunizations

          



             Vaccine      Date         Status       Description  Data Source(s)

 

             COVID-19 VACCINE Moderna 2021 12:00:00 AM EST completed      

           [facility]

 

                                        Vaccine Series Complete: YESThis Data wa

s Submitted to [facility] Via [employer]. 

 

             COVID-19 VACCINE Moderna 2021 12:00:00 AM EST completed      

           [facility]

 

                                        Vaccine Series Complete: NOThis Data was

 Submitted to [facility] [employer]. 

 

                          influenza, recombinant, quadrIvalent,injectable, prese

rvative free 2020 

05:06:00 PM EST     completed                               eCW1 ([facility])

 

                          influenza, recombinant, quadrIvalent,injectable, prese

rvative free 2020 

05:06:00 PM EST     completed                               eCW1 ([facility])

 

                          influenza, recombinant, quadrIvalent,injectable, prese

rvative free 2020 

05:06:00 PM EST     completed                               eCW1 ([facility])

 

                          influenza, recombinant, quadrIvalent,injectable, prese

rvative free 2020 

05:06:00 PM EST     completed                               eCW1 ([facility])

 

                          influenza, recombinant, quadrIvalent,injectable, prese

rvative free 2020 

05:06:00 PM EST     completed                               eCW1 ([facility])

 

                          influenza, recombinant, quadrIvalent,injectable, prese

rvative free 2020 

05:06:00 PM EST     completed                               eCW1 ([facility])

 

                          influenza, recombinant, quadrIvalent,injectable, prese

rvative free 2020 

05:06:00 PM EST     completed                               eCW1 ([facility])



                                                                                
                                                                                
      



Medications

          



          Medication Brand Name Start Date Product Form Dose      Route     Admi

nistrative 

Instructions Pharmacy Instructions Status     Indications Reaction   Description

 Data 

Source(s)

 

                          Amoxicillin 875 MG / Clavulanate 125 MG Oral Tablet 87

5-125 mg 

AMOXICILLIN/POTASSIUM CLAV 2021 12:00:00 AM EDT tablet       14           

             TAKE ONE 

TABLET BY MOUTH TWICE A DAY FOR 7 DAYS  TAKE ONE TABLET BY MOUTH TWICE A DAY FOR

 

7 DAYS       SOLD: 2021                                        [facility]

s

 

                          Amoxicillin 875 MG / Clavulanate 125 MG Oral Tablet Am

oxicillin/Clavulanate 

Potassium 08/10/2021 12:00:00 AM EDT             ORAL              active       

            MED[facility] ([facility])

 

        Prednisone 20 MG Oral Tablet Prednisone 08/10/2021 12:00:00 AM EDT      

           ORAL                    

active                                                          MED[facility] ([facility])

 

                    60 ACTUAT Albuterol 0.09 MG/ACTUAT Metered Dose Inhaler Albu

terol Sulfate HFA 

08/10/2021 12:00:00 AM EDT               RESPIRATORY               active       

               MEDENT ([facility])

 

     Sutab Sutab 2021 12:00:00 AM EDT                          active     

           [facility] ([facility], )

 

                    Bisacodyl 5 MG Delayed Release Oral Tablet [Dulcolax] Dulcol

ax            2021 

12:00:00 AM EDT               ORAL                 active                      M

EDENT ([facility], 

)

 

                          pantoprazole 20 MG Delayed Release Oral Tablet [Proton

ix] Protonix 20 MG 

Protonix 20 MG 2020 12:00:00 AM EST        1.0 {tablet}                   

   active               

Protonix 20 MG                          eCW1 ([facility])

 

                          Citalopram 10 MG Oral Tablet Citalopram Hydrobromide 1

0 MG Citalopram 

Hydrobromide 10 MG 2020 12:00:00 AM EST        1.0 {tablet}               

       active               

Citalopram Hydrobromide 10 MG           eCW1 ([facility])

 

                          Citalopram 10 MG Oral Tablet Citalopram Hydrobromide 1

0 MG Citalopram 

Hydrobromide 10 MG 2020 12:00:00 AM EST        1.0 {tablet}               

       active               

Citalopram Hydrobromide 10 MG           eCW1 ([facility])

 

                          pantoprazole 20 MG Delayed Release Oral Tablet [Proton

ix] Protonix 20 MG 

Protonix 20 MG 2020 12:00:00 AM EST        1.0 {tablet}                   

   active               

Protonix 20 MG                          eCW1 ([facility])

 

                          pantoprazole 20 MG Delayed Release Oral Tablet [Proton

ix] Protonix 20 MG 

Protonix 20 MG 2020 12:00:00 AM EST        1.0 {tablet}                   

   active               

Protonix 20 MG                          eCW1 ([facility])

 

                          pantoprazole 20 MG Delayed Release Oral Tablet [Proton

ix] Protonix 20 MG 

Protonix 20 MG 2020 12:00:00 AM EST        1.0 {tablet}                   

   active               

Protonix 20 MG                          eCW1 ([facility])

 

                          Citalopram 10 MG Oral Tablet Citalopram Hydrobromide 1

0 MG Citalopram 

Hydrobromide 10 MG 2020 12:00:00 AM EST        1.0 {tablet}               

       active               

Citalopram Hydrobromide 10 MG           eCW1 ([facility])

 

                          Citalopram 10 MG Oral Tablet Citalopram Hydrobromide 1

0 MG Citalopram 

Hydrobromide 10 MG 2020 12:00:00 AM EST        1.0 {tablet}               

       active               

Citalopram Hydrobromide 10 MG           eCW1 ([facility])

 

                          pantoprazole 20 MG Delayed Release Oral Tablet [Proton

ix] Protonix 20 MG 

Protonix 20 MG 2020 12:00:00 AM EST        1.0 {tablet}                   

   active               

Protonix 20 MG                          eCW1 ([facility])

 

                          pantoprazole 20 MG Delayed Release Oral Tablet [Proton

ix] Protonix 20 MG 

Protonix 20 MG 2020 12:00:00 AM EST        1.0 {tablet}                   

   active               

Protonix 20 MG                          eCW1 ([facility])

 

                          pantoprazole 20 MG Delayed Release Oral Tablet [Proton

ix] Protonix 20 MG 

Protonix 20 MG 2020 12:00:00 AM EST        1.0 {tablet}                   

   active               

Protonix 20 MG                          eCW1 ([facility])

 

                          pantoprazole 20 MG Delayed Release Oral Tablet [Proton

ix] Protonix 20 MG 

Protonix 20 MG 2020 12:00:00 AM EST        1.0 {tablet}                   

   active               

Protonix 20 MG                          eCW1 ([facility])

 

                          Citalopram 10 MG Oral Tablet Citalopram Hydrobromide 1

0 MG Citalopram 

Hydrobromide 10 MG 2020 12:00:00 AM EST        1.0 {tablet}               

       active               

Citalopram Hydrobromide 10 MG           eCW1 ([facility])

 

                          Citalopram 10 MG Oral Tablet Citalopram Hydrobromide 1

0 MG Citalopram 

Hydrobromide 10 MG 2020 12:00:00 AM EST        1.0 {tablet}               

       active               

Citalopram Hydrobromide 10 MG           eCW1 ([facility])

 

                          Citalopram 10 MG Oral Tablet Citalopram Hydrobromide 1

0 MG Citalopram 

Hydrobromide 10 MG 2020 12:00:00 AM EST        1.0 {tablet}               

       active               

Citalopram Hydrobromide 10 MG           eCW1 ([facility])

 

                          Citalopram 10 MG Oral Tablet Citalopram Hydrobromide 1

0 MG Citalopram 

Hydrobromide 10 MG 2020 12:00:00 AM EST        1.0 {tablet}               

       active               

Citalopram Hydrobromide 10 MG           eCW1 ([facility])

 

                          doxycycline hyclate 100 MG Oral Capsule Doxycycline Hy

clate 100 MG Doxycycline 

Hyclate 100 MG 10/27/2020 12:00:00 AM EDT        1.0 {capsule}                  

    suspended               

Doxycycline Hyclate 100 MG              eCW1 ([facility])

 

                          doxycycline hyclate 100 MG Oral Capsule Doxycycline Hy

clate 100 MG Doxycycline 

Hyclate 100 MG 10/27/2020 12:00:00 AM EDT        1.0 {capsule}                  

    active               

Doxycycline Hyclate 100 MG              eCW1 ([facility])

 

                          Clindamycin 0.01 MG/MG Topical Gel Clindamycin Phospha

te 1 % Clindamycin 

Phosphate 1 % 10/27/2020 12:00:00 AM EDT        1.0 {application}               

       active               

Clindamycin Phosphate 1 %               eCW1 ([facility])

 

                          Clindamycin 0.01 MG/MG Topical Gel Clindamycin Phospha

te 1 % Clindamycin 

Phosphate 1 % 10/27/2020 12:00:00 AM EDT        1.0 {application}               

       active               

Clindamycin Phosphate 1 %               eCW1 ([facility])

 

                          Clindamycin 0.01 MG/MG Topical Gel Clindamycin Phospha

te 1 % Clindamycin 

Phosphate 1 % 10/27/2020 12:00:00 AM EDT        1.0 {application}               

       active               

Clindamycin Phosphate 1 %               eCW1 ([facility])

 

                          doxycycline hyclate 100 MG Oral Capsule Doxycycline Hy

clate 100 MG Doxycycline 

Hyclate 100 MG 10/27/2020 12:00:00 AM EDT        1.0 {capsule}                  

    suspended               

Doxycycline Hyclate 100 MG              eCW1 ([facility])

 

                          Clindamycin 0.01 MG/MG Topical Gel Clindamycin Phospha

te 1 % Clindamycin 

Phosphate 1 % 10/27/2020 12:00:00 AM EDT        1.0 {application}               

       active               

Clindamycin Phosphate 1 %               eCW1 ([facility])

 

                          doxycycline hyclate 100 MG Oral Capsule Doxycycline Hy

clate 100 MG Doxycycline 

Hyclate 100 MG 10/27/2020 12:00:00 AM EDT        1.0 {capsule}                  

    suspended               

Doxycycline Hyclate 100 MG              eCW1 ([facility])

 

                          Clindamycin 0.01 MG/MG Topical Gel Clindamycin Phospha

te 1 % Clindamycin 

Phosphate 1 % 10/27/2020 12:00:00 AM EDT        1.0 {application}               

       active               

Clindamycin Phosphate 1 %               eCW1 ([facility])

 

                          Clindamycin 0.01 MG/MG Topical Gel Clindamycin Phospha

te 1 % Clindamycin 

Phosphate 1 % 10/27/2020 12:00:00 AM EDT        1.0 {application}               

       active               

Clindamycin Phosphate 1 %               eCW1 ([facility])

 

                          Clindamycin 0.01 MG/MG Topical Gel Clindamycin Phospha

te 1 % Clindamycin 

Phosphate 1 % 10/27/2020 12:00:00 AM EDT        1.0 {application}               

       active               

Clindamycin Phosphate 1 %               eCW1 ([facility])

 

                          doxycycline hyclate 100 MG Oral Capsule Doxycycline Hy

clate 100 MG Doxycycline 

Hyclate 100 MG 10/27/2020 12:00:00 AM EDT        1.0 {capsule}                  

    suspended               

Doxycycline Hyclate 100 MG              eCW1 ([facility])

 

                          doxycycline hyclate 100 MG Oral Capsule Doxycycline Hy

clate 100 MG Doxycycline 

Hyclate 100 MG 10/27/2020 12:00:00 AM EDT        1.0 {capsule}                  

    suspended               

Doxycycline Hyclate 100 MG              eCW1 ([facility])

 

                          doxycycline hyclate 100 MG Oral Capsule Doxycycline Hy

clate 100 MG Doxycycline 

Hyclate 100 MG 10/27/2020 12:00:00 AM EDT        1.0 {capsule}                  

    suspended               

Doxycycline Hyclate 100 MG              eCW1 ([facility])

 

                          doxycycline hyclate 100 MG Oral Capsule Doxycycline Hy

clate 100 MG Doxycycline 

Hyclate 100 MG 10/27/2020 12:00:00 AM EDT        1.0 {capsule}                  

    suspended               

Doxycycline Hyclate 100 MG              eCW1 ([facility])

 

                          Clindamycin 0.01 MG/MG Topical Gel Clindamycin Phospha

te 1 % Clindamycin 

Phosphate 1 % 10/27/2020 12:00:00 AM EDT        1.0 {application}               

       active               

Clindamycin Phosphate 1 %               eCW1 ([facility])



                                                                                
                                                                                
                                                                                
                                                                                
                                                                                
                                      



Insurance Providers

          



             Payer name   Policy type / Coverage type Policy ID    Covered party

 ID Covered 

party's relationship to navarrete Policy Navarrete             Plan Information

 

          [facility]               [id number]            [place]                 [id number]

 

          [facility]               [id number]                             [id number]

 

          SELF PAY            UNAVAILABLE           S                   UNAVAILA

BLE

 

                    [employer] [id number]        

                       

[id number]

 

                    [employer] [id number]        

                       

[id number]

 

                    [employer] [id number]        

                       

[id number]

 

                    [employer] [id number]        

                       

[id number]

 

                    [employer] [id number]        

                       

[id number]

 

                    [employer] [id number]        

                       

[id number]

 

                    [employer] [id number]        

                       

[id number]

 

                    [employer] [id number]        

                       

[id number]

 

                    [employer] [id number]        

                       

[id number]

 

                    [employer] [id number]        

                       

[id number]

 

                    [employer] [id number]        

                       

[id number]

 

                    [employer] [id number]        

                       

[id number]

 

          [facility]       O         [id number] S                   [id number]

 

          [employer] P[employer] O         [id number] S                   [id number]

 

          [employer]               [id number]           SP                  [id number]

 

                [employer]           [employer] (H[employer]) [id number] 

      

[id number]                                    

[id number]

 

          [employer]               [id number]           SP                  [id number]

 

          [facility]       CO        [id number]           [id number]                  [id number]

 

          SELF PAY            UNAVAILABLE           SP                  UNAVAILA

BLE

 

          [facility]           [id number]           SP               

   [id number]



                                                                                
                                                                                
                                                                                
                                                                    



Problems, Conditions, and Diagnoses

          



           Code       Display Name Description Problem Type Effective Dates Data

 Source(s)

 

             Z23          Encounter for immunization ENCOUNTER FOR IMMUNIZATION 

Diagnosis    2021 

11:29:00 AM [facility]

 

                    K21.9               [id number]           Gastroesophageal ref

lux disease, unspecified whether esophagitis

 present            Problem             2021 12:00:00 AM EST eCW1 ([facility])

 

           D23.9      [id number]  Dermatofibroma Problem    10/27/2020 12:00:00 A

M EDT eCW1 

([facility])

 

           L81.2      [id number]  Ephelides  Problem    10/27/2020 12:00:00 AM ED

T eCW1 ([facility])

 

           D18.01     [id number]    Cherry angioma Problem    10/27/2020 12:00:00 A

M EDT eCW1 ([facility])

 

             D22.4        [id number]    Melanocytic nevi of scalp and neck Proble

m      10/27/2020 12:00:00 

AM EDT                                  eCW1 ([facility])

 

           L73.9      [id number]   Folliculitis Problem    10/27/2020 12:00:00 AM 

EDT eCW1 ([facility])

 

           D22.5      [id number]  Melanocytic nevi of trunk Problem    10/27/2020

 12:00:00 AM EDT 

eCW1 ([facility])

 

                D22.61          [id number]       Melanocytic nevi of right upper 

limb, including shoulder 

Problem                   10/27/2020 12:00:00 AM EDT eCW1 ([facility])

 

                D22.62          [id number] Melanocytic nevi of left upper l

imb, including shoulder 

Problem                   10/27/2020 12:00:00 AM EDT eCW1 ([facility])

 

           L81.4      [id number]  Lentigines Problem    10/27/2020 12:00:00 AM ED

T eCW1 ([facility])

 

           L73.2      [id number]   Hidradenitis suppurativa Problem    10/27/2020 

12:00:00 AM EDT eCW1 

([facility])

 

           L72.9      [id number]  Cyst of skin Problem    10/27/2020 12:00:00 AM 

EDT eCW1 ([facility])

 

                D22.72          [id number] Melanocytic nevi of left lower l

imb, including hip 

Problem                   10/27/2020 12:00:00 AM EDT eCW1 ([facility])

 

             D22.71       [id number]    Melanocytic nevi of right lower limb, inc

luding hip Problem      

10/27/2020 12:00:00 AM EDT              eCW1 ([facility])



                                                                                
                                                                                
                                                                              



Surgeries/Procedures

          



             Procedure    Description  Date         Indications  Data Source(s)

 

             OFFICE OUTPATIENT NEW 30 MINUTES              08/10/2021 12:00:00 A

M EDT              [facility] 

([facility], [facility])

 

                    Imm: Flublok Quadrivalent 18 years & older 0.5mL IM Influenz

a                     2020 

12:00:00 AM EST                                     eCW1 ([facility])



                                                                                
                  



Results

          



                    ID                  Date                Data Source

 

                    [id number]         08/10/2021 12:00:00 AM EDT [facility]









          Name      Value     Range     Interpretation Code Description Data Eliza

rce(s) Supporting 

Document(s)

 

          SARS-CoV2 Rapid Antigen Negative                                [facility]

     

 

                                        This lab was reported by [facility]. 









                    ID                  Date                Data Source

 

                    [id number]      2021 12:00:00 AM EDT [facility]









          Name      Value     Range     Interpretation Code Description Data Eliza

rce(s) Supporting 

Document(s)

 

          SARS-CoV2 Rapid Antigen Negative                                [facility]

     

 

                                        This lab was ordered by [facility] an

d reported by [facility]. 









                    ID                  Date                Data Source

 

                    URINE CULTURE       2020 12:00:00 AM EST eCW1 ([facility])









          Name      Value     Range     Interpretation Code Description Data Eliza

rce(s) Supporting 

Document(s)

 

                                                  URINE CULTURE eCW1 ([facility])  







                                        Procedure

 

                                          



                                                                                
                                      



Social History

          



           Code       Duration   Value      Status     Description Data Source(s

)

 

           Smoking    2020 12:00:00 AM EST Current Smoker completed  Curre

nt Smoker eCW1 

([facility])

 

           Smoking    2020 12:00:00 AM EST Current Smoker completed  Curre

nt Smoker eCW1 

([facility])

 

           Smoking    2020 12:00:00 AM EST Current Smoker completed  Curre

nt Smoker eCW1 

([facility])

 

           Smoking    2020 12:00:00 AM EST Current Smoker completed  Curre

nt Smoker eCW1 

([facility])

 

           Smoking    2020 12:00:00 AM EST Current Smoker completed  Curre

nt Smoker eCW1 

([facility])

 

           Smoking    2020 12:00:00 AM EST Current Smoker completed  Curre

nt Smoker eCW1 

([facility])

 

           Smoking    2020 12:00:00 AM EST Current Smoker completed  Curre

nt Smoker eCW1 

([facility])

 

           Smoking    10/27/2020 12:00:00 AM EDT Current Smoker completed  Curre

nt Smoker eCW1 

([facility])



                                                                                
                                                                                
        



Vital Signs

          



                    ID                  Date                Data Source

 

                    UNK                                      









           Name       Value      Range      Interpretation Code Description Data

 Source(s)

 

           Systolic blood pressure 106 mm[Hg]                       106 mm[Hg] M

EDENT ([facility],

 [facility])

 

           Diastolic blood pressure 72 mm[Hg]                        72 mm[Hg]  

MEDENT ([facility], 

[facility])

 

           Heart rate 68 /min                          68 /min    MEDENT ([facility], [facility])

 

           Respiratory rate 18 /min                          18 /min    MEDENT (

[facility], [facility])

 

           Oxygen saturation in Arterial blood by Pulse oximetry 98 %           

                  98 %       MEDENT 

([facility])

 

           Body temperature 98.6 [degF]                       98.6 [degF] [facility]

 ([facility])

 

           Body weight 221.00 [lb_av]                       221.00 [lb_av] MEDEN

T ([facility])

 

           Body height 69 [in_i]                        69 [in_i]  MEDENT ([facility])

 

                                        5'9" 

 

           Body mass index (BMI) [Ratio] 32.6 kg/m2                       32.6 k

g/m2 [facility] ([facility])

 

           Body surface area Derived from formula 2.14 m2                       

   2.14 m2    [facility] ([facility])

 

           Systolic blood pressure 120 mm[Hg]                       120 mm[facility]Hg[facility]

 

           Diastolic blood pressure 82 mm[Hg]                        82 mm[Hg]  

[facility] ([facility])

 

           Body weight 218.00 [lb_av]                       218.00 [lb_av] [facility]

T ([facility])

 

           Body mass index (BMI) [Ratio] 32.2 kg/m2                       32.2 k

g/m2 [facility] ([facility])

 

           Ideal body weight 160 [lb_av]                       [facility]

[facility]

 

           Body weight 98.885 kg                        98.885 kg  [facility] ([facility])

 

           Body height 69 [in_i]                        69 [in_i]  [facility] ([facility])

 

                                        5'9" 

 

           Body weight 221.0 [lb_av]                       221.0 [lb_av] eCW1 ([facility])

 

           Body height 69 [in_i]                        69 [in_i]  eCW1 ([facility])

 

           Body mass index (BMI) [Ratio] 32.63 kg/m2                       32.63

 kg/m2 W1 ([facility])

 

           Heart rate 93 /min                          93 /min    eCW1 ([facility])

 

           Respiratory rate 18 /min                          18 /min    eCW1 ([facility])

 

           Body temperature 98.2 [degF]                       98.2 [degF] eCW1 (

[facility])

 

           Systolic blood pressure 114 mm[Hg]                       114 mm[Hg] e

CW1 ([facility])

 

           Diastolic blood pressure 72 mm[Hg]                        72 mm[Hg]  

eCW1 ([facility])

 

           Body weight 214.8 [lb_av]                       214.8 [lb_av] eCW1 ([facility])

 

           Body height 69 [in_i]                        69 [in_i]  eCW1 ([facility])

 

           Body mass index (BMI) [Ratio] 31.72 kg/m2                       31.72

 kg/m2 eCW1 ([facility])

 

           Systolic blood pressure 122 mm[Hg]                       122 mm[Hg] e

CW1 ([facility])

 

           Diastolic blood pressure 74 mm[Hg]                        74 mm[Hg]  

eCW1 ([facility])



                                                                                
                  



Patient Treatment Plan of Care

          



             Planned Activity Planned Date Details      Description  Data Source

(s)

 

             Citalopram 10 MG Oral Tablet 2020 12:00:00 AM EST            

               eCW1 ([facility])

 

                          pantoprazole 20 MG Delayed Release Oral Tablet [Proton

ix] 2020 12:00:00 AM

 EST                                                        eCW1 ([facility])

 

             Citalopram 10 MG Oral Tablet 2020 12:00:00 AM EST            

               eCW1 ([facility])

 

                          pantoprazole 20 MG Delayed Release Oral Tablet [Proton

ix] 2020 12:00:00 AM

 EST                                                        eCW1 ([facility])

 

             Citalopram 10 MG Oral Tablet 2020 12:00:00 AM EST            

               eCW1 ([facility])

 

                          pantoprazole 20 MG Delayed Release Oral Tablet [Proton

ix] 2020 12:00:00 AM

 EST                                                        eCW1 ([facility])

 

                          pantoprazole 20 MG Delayed Release Oral Tablet [Proton

ix] 2020 12:00:00 AM

 EST                                                        eCW1 ([facility])

 

                          pantoprazole 20 MG Delayed Release Oral Tablet [Proton

ix] 2020 12:00:00 AM

 EST                                                        eCW1 ([facility])

 

             Citalopram 10 MG Oral Tablet 2020 12:00:00 AM EST            

               eCW1 ([facility])

 

             Clindamycin 0.01 MG/MG Topical Gel 10/27/2020 12:00:00 AM EDT      

                     eCW1 

([facility])

 

             doxycycline hyclate 100 MG Oral Capsule 10/27/2020 12:00:00 AM EDT 

                          eCW1 

([facility])

## 2021-10-18 NOTE — CCD
Continuity of Care Document (CCD)

                             Created on: 2021



Maverick Dee

External Reference #: [id number]

: 1980

Sex: Male



Demographics





                          Address                   [address] [place]  [address]

 

                          Home Phone                [phone]

 

                          Preferred Language        Unknown

 

                          Marital Status            Unknown

 

                          Caodaism Affiliation     Unknown

 

                          Race                      White

 

                          Ethnic Group              Not  or 





Author





                          Author                    Maverick SCOTT [facility]

 

                          Organization              Unknown

 

                          Address                   [address], [address]

[place]  [address]



 

                          Phone                     [phone]







Care Team Providers





                    Care Team Member Name Role                Phone

 

                    Tony Licea M.D. AUTM                [phone]







Problems





                                        Description

 

                                        No Active Problems







Social History





                Type            Date            Description     Comments

 

                Birth Sex                       Unknown          

 

                ETOH Use                        2-6 drinks 1-3 times per week  

 

                Tobacco Use     Start: Unknown  Non Smoker       







Allergies, Adverse Reactions, Alerts





                                        Description

 

                                        No Known Drug Allergies







Medications





           Active Medications SIG        Qnty       Indications Ordering Provide

r Date

 

                          Sutab                     [id number] Tablets      

             take tablets 

as directed per doctor for bowel prep. 1kit            Z12.11          Rusty colón MD 2021

 

                          Dulcolax                     5mg Tablets DR           

        take 4 tabs by 

mouth prior to procedure per instructions. 4tabs           Z12.11          Rusty Araujo MD 

2021

 

                          Pantoprazole Sodium                     20mg Tablets D

R                   1/2 

tablet every other day                                 Unknown         

0

 

                          Citalopram Hydrobromide                     10mg Table

ts                   Daily

                                                Unknown         







Immunizations





                                        Description

 

                                        No Information Available







Vital Signs





                Date            Vital           Result          Comment

 

                2021  8:07am BP Systolic     120 mmHg         

 

                    BP Diastolic        82 mmHg              

 

                    Height              69 inches           5'9"

 

                    Weight              218.00 lb            

 

                    BMI (Body Mass Index) 32.2 kg/m2           

 

                    Ideal Body Weight   160 lb               

 

                    Weight              98.885 kg            

 

                    BSA (Body Surface Area) 2.14 m2              

 

                2018  1:06pm BP Systolic     108 mmHg         

 

                    BP Diastolic        80 mmHg              

 

                    Height              69 inches           5'9"

 

                    Weight              219.00 lb            

 

                    BMI (Body Mass Index) 32.3 kg/m2           

 

                    Ideal Body Weight   160 lb               

 

                    Weight              99.338 kg            

 

                    BSA (Body Surface Area) 2.15 m2              







Results





                                        Description

 

                                        No Information Available







Procedures





                                        Description

 

                                        No Information Available







Medical Devices





                                        Description

 

                                        No Information Available







Encounters





                                        Description

 

                                        No Information Available







Assessments





                Date            Code            Description     Provider

 

                2021      Z12.11          Encounter for screening for rosa

gnant neoplasm of colon 

Destinee ScottALMA

 

                2021      Z86.010         Personal history of colonic poly

ps Destinee SebastianALMA winn

 

                2021      Z80.0           Family history of malignant neop

lasm of digestive organs 

Destineecheryle SebastianALMA winn

 

                2021      K21.9           Gastro-esophageal reflux disease

 without esophagitis Destinee SebastianALMA winn







Plan of Treatment

2021 - Destinee GUNDERSON ALMA Scott* Z12.11 Encounter for screening for 
  malignant neoplasm of colon

* Z86.010 Personal history of colonic polyps

* Z80.0 Family history of malignant neoplasm of digestive organs

* K21.9 Gastro-esophageal reflux disease without esophagitis

* * New Medication:* Sutab [id number] mg

* Dulcolax 5 mg



* New Orders:* Colonoscopy, Ordered: 21



* Comments:* Will arrange for colonoscopy. Reviewed risks and benefits of the 
  procedure, as well as other options, with the patient. Bowel prep procedure 
  was discussed with patient, as well as risks and side effects associated with 
  the bowel prep. Patient verbalized understanding of all of the above and is in
  agreement to proceed. Patient will seek medical attention for any acute 
  changes. Will monitor.



* Follow up:* As scheduled, sooner if needed.









Functional Status





                                        Description

 

                                        No Information Available







Mental Status





                                        Description

 

                                        No Information Available







Referrals





                                        Description

 

                                        No Information Available

## 2021-10-18 NOTE — CCD
Summarization of Episode Note

                             Created on: 2021



STEPHY CHRISTINA HILARIO

External Reference #: [id number]

: 1980

Sex: Male



Demographics





                          Address                   [address] [place]  [address]

 

                          Home Phone                [phone]

 

                          Preferred Language        Unknown

 

                          Marital Status            Unknown

 

                          Anabaptist Affiliation     Unknown

 

                          Race                      White

 

                          Ethnic Group              Not  or 





Author





                          Author                    [facility] [employer]

ems

 

                          Organization              [facility] [employer]

ems

 

                          Address                   Unknown

 

                          Phone                     Unavailable







Support





                Name            Relationship    Address         Phone

 

                    CHRISTINA KEYES                [address], [place]  [address] [phone]

 

                    RayaAna rincon          [employer]                [address] [place]  [address]                  [phone]







Care Team Providers





                    Care Team Member Name Role                Phone

 

                    Wagner Warren   Unavailable         [phone]







PROBLEMS





          Type      Condition ICD9-CM Code RRJ27-PU Code Onset Dates Condition S

tatus W/U 

Status              Risk                SNOMED Code         Notes

 

       Problem Hiatal hernia 553.3                Active confirmed        736426

09  

 

       Problem Anxiety 300.00               Active confirmed        25183543  

 

          Problem   Diaphragmatic hernia without obstruction or gangrene        

   K44.9               Active    

confirmed                               [id number]             

 

       Problem Cardiac risk counseling V65.49               Active confirmed    

    [id number]  

 

          Problem   Melanocytic nevi of right lower limb, including hip         

  D22.71              Active    

confirmed                               [id number]            

 

          Problem   Melanocytic nevi of left lower limb, including hip          

 D22.72              Active    

confirmed                               [id number]      

 

       Problem Anxiety with depression        F41.8         Active confirmed    

    298511974  

 

       Problem BMI 31.0-31.9,adult        Z68.31        Active confirmed        

105268179  

 

       Problem Anxiety        F41.9         Active confirmed        19054568  

 

       Problem Anorgasmia of male        F52.32        Active confirmed        8

5813473  

 

       Problem Cyst of skin        L72.9         Active confirmed        1856364

01  

 

       Problem BMI 30.0-30.9,adult        Z68.30        Active confirmed        

074255329  

 

       Problem Hidradenitis suppurativa        L73.2         Active confirmed   

     08389345  

 

       Problem Lentigines        L81.4         Active confirmed        727386795

  

 

           Problem    Melanocytic nevi of left upper limb, including shoulder   

         D22.62                

Active          confirmed                       [id number]  

 

           Problem    Melanocytic nevi of right upper limb, including shoulder  

          D22.61                

Active          confirmed                       270020435        

 

       Problem Melanocytic nevi of trunk        D22.5         Active confirmed  

      891897688  

 

       Problem Folliculitis        L73.9         Active confirmed        1410906

6  

 

                Problem         Gastroesophageal reflux disease, esophagitis pre

sence not specified                 

K21.9                 Active     confirmed             [id number]   

 

                          Problem                   Gastroesophageal reflux dise

ase, unspecified whether esophagitis present

                K21.9           Active  confirmed         749232432  

 

       Problem Anxiety disorder, unspecified        F41.9         Active confirm

ed        397880165  

 

                          Problem                   Major depressive disorder in

 partial remission, unspecified whether 

recurrent         F32.4           Active  confirmed         87149715  

 

        Problem Melanocytic nevi of scalp and neck         D22.4           Activ

e  confirmed         

350102319                                

 

       Problem Cherry angioma        D18.01        Active confirmed        46207

01  

 

       Problem Ephelides        L81.2         Active confirmed        566963801 

 

 

       Problem Dermatofibroma        D23.9         Active confirmed        16947

6000  







ALLERGIES

No Known Allergies



ENCOUNTERS from 1980 to 2021





             Encounter    Location     Date         Provider     Diagnosis

 

                    [place] [id number] [facility] [phone] [place] [address] 12 Aug, 2021

                          Wagner Warren            







IMMUNIZATIONS





                Vaccine         Route           Administration Date Status

 

                Influenza 18 yrs & older Flublok IM Intramuscular Dec 07, 2020  

  Administered

 

                Influenza 18 yrs & older Flublok IM Intramuscular 2020

  Administered

 

                Pneumococcal Adult 0.5mL Pneumovax 23 IM Intramuscular 2020  

Administered

 

                Influenza 6mo & up Fluzone IM Intramuscular Oct 18, 2018    Admi

nistered

 

                Influenza 6mo & up Fluzone IM Intramuscular Oct 05, 2017    Admi

nistered







SOCIAL HISTORY

Tobacco Use:



                    Social History Observation Description         Date

 

                    Details (start date - stop date) Current Smoker       



Sex Assigned At Birth:



                          Social History Observation Description

 

                          Sex Assigned At Birth     Unknown



Education:



                    Question            Answer              Notes

 

                    Level of Education: College              



Audit



                    Question            Answer              Notes

 

                    Total Score:        1                    

 

                    Interpretation:     Alcohol Education    



Language:



                    Question            Answer              Notes

 

                    Languages spoken:   English              



Advent:



                    Question            Answer              Notes

 

                    Advent                                No Latter-day beliefs

 that would impact health care.



Sexual Hx:



                    Question            Answer              Notes

 

                    Had sex in the last 12 months (vaginal, oral, or anal)? Yes 

                 

 

                    Have you ever had an STD? No                   

 

                    with                Women only           

 

                    Use protection?     No                   



Drug and Alcohol



                    Question            Answer              Notes

 

                    Total Score:        0                    

 

                    Interpretation:     No problems reported  



Alcohol Screening:



                    Question            Answer              Notes

 

                    Did you have a drink containing alcohol in the past year? Ye

s                  

 

                    Points              7                    

 

                    Interpretation      Positive             

 

                          How often did you have six or more drinks on one occas

ion in the past year? 

Monthly (2 points)                       

 

                                        How many drinks did you have on a typica

l day when you were drinking in the past

year?                     7 to 9 (3 points)          

 

                          How often did you have a drink containing alcohol in t

he past year? Two to four 

times a month (2 points)                 



BMI Care Goal Follow-Up



                    Question            Answer              Notes

 

                    Above Normal BMI Follow-Up Lifestyle education regarding die

t  



Tobacco Use:



                    Question            Answer              Notes

 

                    Are you a:          current smoker       







REASON FOR REFERRAL

No Information



VITAL SIGNS

No information



MEDICATIONS





           Medication SIG (Take, Route, Frequency, Duration) Notes      Start Da

te End Date   

Status

 

                          Doxycycline Hyclate 100 MG 1 capsule Orally Twice a da

y with food and water for 

30 days                         27 Oct, 2020                    Not-Taking

 

                          Clindamycin Phosphate 1 % 1 application Externally Onc

e a day to areas with acne

like bumps after washing with Hibiclens for 30 days                 27 Oct, 2020

                    Active

 

           Lexapro 10 mg 1 cap(s) orally daily for 30 days            15 Mauro, 20

15            Not-Taking

 

           Protonix 20 MG 1 tablet Orally Once a day for 30 day(s)                        Active

 

                Citalopram Hydrobromide 10 MG 1 tablet Orally Once a day for 90 

day(s)                                                                 Active







PROCEDURES

No Information



RESULTS

No Results



REASON FOR VISIT

cough chest congestion 



MEDICAL (GENERAL) HISTORY





                    Type                Description         Date

 

                    Medical History     Generalized anxiety disorder  

 

                    Medical History     Gastroesophageal reflux disease  

 

                    Surgical History    tonsillectomy & adenoids  

 

                    Surgical History    pilonial cyst       

 

                    Surgical History    EGD                 2015

 

                    Hospitalization History related to surgery   

 

                    Hospitalization History Admitted dehydration  

 

                    Hospitalization History Admitted MH          







Goals Section

No Information



Health Concerns

No Information



MEDICAL EQUIPMENT

No Information



MENTAL STATUS

No Information



FUNCTIONAL STATUS

No Information



ASSESSMENTS

No Information



PLAN OF TREATMENT

Medication



                Medication Name Sig             Start Date      Stop Date

 

                    Citalopram Hydrobromide 10 MG 1 tablet Orally Once a day for

 90 day(s)                                      

 

                Protonix 20 MG  1 tablet Orally Once a day for 30 day(s)      



Next Appt



                                        Details

 

                                        Provider Name:Michelle Loyd, 2021-10-

29 08:15:00 AM, [address] [place] [address], 

[phone], [place], [place], [facility], [phone]







Insurance Providers





             Payer Name   Payer Address Payer Phone  Insured Name Patient Relati

onship to 

Insured                   Coverage Start Date       Coverage End Date

 

                [facility] [address]  [facility] [phone]

[phone]    

CHRISTINA KEYES        self

## 2021-10-18 NOTE — CCD
Summarization Of Episode

                             Created on: 10/18/2021



CHRISTINA KEYES

External Reference #: [id number]

: 1980

Sex: Undifferentiated



Demographics





                          Address                   [address] [place]  [address]

 

                          Home Phone                [phone]

 

                          Preferred Language        English

 

                          Marital Status            Unknown

 

                          Buddhism Affiliation     Unknown

 

                          Race                      Unknown

 

                          Ethnic Group              Not  or 





Author





                          Author                    [facility]

 

                          Organization              [facility]

 

                          Address                   Unknown

 

                          Phone                     Unavailable







Support





                Name            Relationship    Address         Phone

 

                    INDRVCTL            Next Of [facility] [address]

[place]  [address]                 [phone]

 

                    [place]  [address]                  [phone]

 

                    [facility] Next Of Kin         [id number] [facility] [facility] [address]

[place]  [address]                 [phone]

 

                    SHARAD ADAMS         Next Of [facility]         [address] [place]  [address]                  [phone]

 

                    INDIANRIV*          Next Of Kin         [address] [facility] [address]

[place]  [address]                 [phone]

 

                              Next Of Kin     Unknown         Unavailable

 

                    POONAM KEYES    Next Of Kin         [address] [place]  [address]                  [phone]

 

                    MARILEE KEYES   Next Of Kin         [address] [place]  [address]                      [phone]

 

                    Sharad Adams         ECON                [address] [place]  [address]                  [phone]







Care Team Providers





                    Care Team Member Name Role                Phone

 

                    LADAN HARPM PA-C Unavailable         Unavailable

 

                    DJOUINI  ZAK PA-C Unavailable         Unavailable

 

                    FRANCISCOOUINI,  ZAK PA-C Unavailable         Unavailable

 

                    DJOUINI  ZAK PA-C Unavailable         Unavailable

 

                    DJOUINI,  ZAK PA-C Unavailable         Unavailable

 

                    DJOUINI,  ZAK PA-C Unavailable         Unavailable

 

                    RUTHANNGABRIELA TONY PA Unavailable         Unavailable

 

                    RUTHANNGABRIELA PA Unavailable         Unavailable

 

                    RUTHANNGABRIELA TONY PA Unavailable         Unavailable

 

                    RUTHANNGABRIELA TONY PA Unavailable         Unavailable

 

                    RUTHANNGABRIELA PA Unavailable         Unavailable

 

                    RUTHANNGABRIELA PA Unavailable         Unavailable

 

                    RUTHANNRAMYAGABRIELA PA Unavailable         Unavailable

 

                    RUTHANNGABRIELA PA Unavailable         Unavailable

 

                    RUTHANN,  GABRIELA PA Unavailable         Unavailable

 

                    RUTHANNCARMINAHAN PA Unavailable         Unavailable

 

                    RUTHANNRAMYAGABRIELA PA Unavailable         Unavailable

 

                    RUTHANN,  GABRIELA PA Unavailable         Unavailable

 

                    RUTHANN,  GABRIELA PA Unavailable         Unavailable

 

                    RUTHANN,  GABRIELA PA Unavailable         Unavailable

 

                    RUTHANN,  GABRIELA PA Unavailable         Unavailable

 

                    RUTHANN,  GABRIELA PA Unavailable         Unavailable

 

                    RUTHANN,  GABRIELA PA Unavailable         Unavailable

 

                    RUTHANN,  GABRIELA PA Unavailable         Unavailable

 

                    RUTHANN,  GABRIELA PA Unavailable         Unavailable

 

                    RUTHANN,  GABRIELA PA Unavailable         Unavailable

 

                    RUTHANN,  GABRIELA PA Unavailable         Unavailable

 

                    RUTHANN,  GABRIELA PA Unavailable         Unavailable

 

                    RUTHANN,  GABRIELA PA Unavailable         Unavailable

 

                    RUTHANN,  GABRIELA PA Unavailable         Unavailable

 

                    RUTHANN,  GABRIELA PA Unavailable         Unavailable

 

                    RUTHANN,  GABRIELA PA Unavailable         Unavailable

 

                    RUTHANN,  GABRIELA PA Unavailable         Unavailable

 

                    RUTHANN,  GABRIELA PA Unavailable         Unavailable

 

                    RUTHANN,  GABRIELA PA Unavailable         Unavailable

 

                    RUTHANN,  GABRIELA PA Unavailable         Unavailable

 

                    RUTHANN,  GABRIELA PA Unavailable         Unavailable

 

                    RUTHANN,  GABRIELA PA Unavailable         Unavailable

 

                    RUTHANN,  GABRIELA PA Unavailable         Unavailable

 

                    RUTHANN,  GABRIELA PA Unavailable         Unavailable

 

                    RUTHANN,  GABRIELA PA Unavailable         Unavailable

 

                    RUTHANN,  GABRIELA PA Unavailable         Unavailable

 

                    Rydberg,  Leyda PA Unavailable         Unavailable

 

                    Rydberg,  Leyda PA Unavailable         Unavailable

 

                    Rydberg,  Leyda PA Unavailable         Unavailable

 

                    Rydberg,  Leyda PA Unavailable         Unavailable

 

                    Rydberg,  Leyda PA Unavailable         Unavailable

 

                    Rydberg,  Leyda PA Unavailable         Unavailable

 

                    Rydberg,  Leyda PA Unavailable         Unavailable

 

                    Rydberg,  Leyda PA Unavailable         Unavailable

 

                    Rydberg,  Leyda PA Unavailable         Unavailable

 

                    Rydberg,  Leyda PA Unavailable         Unavailable

 

                    Rydberg,  Leyda PA Unavailable         Unavailable

 

                    Rydberg,  Leyda PA Unavailable         Unavailable

 

                    Rydberg,  Leyda PA Unavailable         Unavailable

 

                    Rydberg,  Leyda PA Unavailable         Unavailable

 

                    Rydberg,  Leyda PA Unavailable         Unavailable

 

                    Rydberg,  Leyda PA Unavailable         Unavailable

 

                    Rydberg,  Leyda PA Unavailable         Unavailable

 

                    Rydberg,  Leyda PA Unavailable         Unavailable

 

                    Rydberg,  Leyda PA Unavailable         Unavailable

 

                    Rydberg,  Leyda PA Unavailable         Unavailable

 

                    Rydberg,  Leyda PA Unavailable         Unavailable

 

                    Rydberg,  Leyda PA Unavailable         Unavailable

 

                    Rydberg,  Leyda PA Unavailable         Unavailable



                                  



Re-disclosure Warning

          The records that you are about to access may contain information from 
federally-assisted alcohol or drug abuse programs. If such information is 
present, then the following federally mandated warning applies: This information
has been disclosed to you from records protected by federal confidentiality 
rules (42 CFR part 2). The federal rules prohibit you from making any further 
disclosure of this information unless further disclosure is expressly permitted 
by the written consent of the person to whom it pertains or as otherwise 
permitted by 42 CFR part 2. A general authorization for the release of medical 
or other information is NOT sufficient for this purpose. The Federal rules 
restrict any use of the information to criminally investigate or prosecute any 
alcohol or drug abuse patient.The records that you are about to access may 
contain highly sensitive health information, the redisclosure of which is 
protected by Article 27-F of the [facility] Public Health law. If you 
continue you may have access to information: Regarding HIV / AIDS; Provided by 
facilities licensed or operated by the [facility] Office of Mental Health; 
or Provided by the [facility] Office for People With Developmental 
Disabilities. If such information is present, then the following [place] 
mandated warning applies: This information has been disclosed to you from 
confidential records which are protected by state law. State law prohibits you 
from making any further disclosure of this information without the specific 
written consent of the person to whom it pertains, or as otherwise permitted by 
law. Any unauthorized further disclosure in violation of state law may result in
a fine or FDC sentence or both. A general authorization for the release of 
medical or other information is NOT sufficient authorization for further disc
losure.                                                                         
    



Family History

          



             Family Member Name Family Member Gender Family Member Status Date o

f Status 

Description                             Data Source(s)

 

           Unknown    Unknown    Problem                          [facility] ([facility], )



                                                                                
       



Encounters

          



           Encounter  Providers  Location   Date       Indications Data Source(s

)

 

                Outpatient      Attender: ZAK HARP PA-C                 10

/ 08:49:00 AM EDT - 

10/18/2021 08:49:00 AM EDT                           [facility]

 

                Unknown                         [address] [facility]

[place] [address] 2021 12:00:00 AM 

EDT                                                 eCW1 (Critical access hospital)

 

                Outpatient      Attender: GABRIELA cook 08/10/2021 

09:10:00 AM EDT                                     [facility] ([facility]

 

                Unknown                         [address]

[place] [address] 2021 12:00:00 AM 

EDT                                                 eCW1 (Critical access hospital)

 

                Unknown                         [address] [facility]

[place] [address] 2021 12:00:00 AM 

EDT                                                 eCW1 (Critical access hospital)

 

                Unknown                         [address] [facility]

[place] [address] 2021 12:00:00 AM 

EDT                                                 eCW1 (Critical access hospital)

 

           Outpatient Attender: Leyda FERNANDEZ            2021 11:29:00

 AM [facility]

 

           Outpatient Attender: Leyda FERNANDEZ            2021 01:15:00

 PM [facility]

 

                                        Admission cancelled. Disregard status an

d admitted date. 

 

                Unknown                         [address] [facility]

[place] [address] 2020 12:00:00 AM 

EST                                                 eCW1 (Critical access hospital)

 

                Outpatient                      [address] [facility]

[place] [address] 2020 12:00:00 AM

EST                                                 eCW1 (Critical access hospital)

 

                Unknown                         [address] [facility]

[place] [address] 2020 12:00:00 AM 

EST                                                 eCW1 (Critical access hospital)

 

                Outpatient                      [address] [facility]

[place] [address] 10/27/2020 12:00:00 AM

EDT                                                 eCW1 (Critical access hospital)



                                                                                
                                                                                
                                    



Immunizations

          



             Vaccine      Date         Status       Description  Data Source(s)

 

             COVID-19 VACCINE Moderna 2021 12:00:00 AM EST completed      

           [facility]

 

                                        Vaccine Series Complete: YESThis Data wa

s Submitted to [facility] Via [employer]. 

 

             COVID-19 VACCINE Moderna 2021 12:00:00 AM EST completed      

           [facility]

 

                                        Vaccine Series Complete: NOThis Data was

 Submitted to [facility] Via [employer] 

 

                          influenza, recombinant, quadrIvalent,injectable, prese

rvative free 2020 

05:06:00 PM EST     completed                               eCW1 ([facility])

 

                          influenza, recombinant, quadrIvalent,injectable, prese

rvative free 2020 

05:06:00 PM EST     completed                               eCW1 ([facility])

 

                          influenza, recombinant, quadrIvalent,injectable, prese

rvative free 2020 

05:06:00 PM EST     completed                               eCW1 ([facility])

 

                          influenza, recombinant, quadrIvalent,injectable, prese

rvative free 2020 

05:06:00 PM EST     completed                               eCW1 ([facility])

 

                          influenza, recombinant, quadrIvalent,injectable, prese

rvative free 2020 

05:06:00 PM EST     completed                               eCW1 ([facility])

 

                          influenza, recombinant, quadrIvalent,injectable, prese

rvative free 2020 

05:06:00 PM EST     completed                               eCW1 ([facility])

 

                          influenza, recombinant, quadrIvalent,injectable, prese

rvative free 2020 

05:06:00 PM EST     completed                               eCW1 ([facility])



                                                                                
                                                                                
      



Medications

          



          Medication Brand Name Start Date Product Form Dose      Route     Admi

nistrative 

Instructions Pharmacy Instructions Status     Indications Reaction   Description

 Data 

Source(s)

 

                          Amoxicillin 875 MG / Clavulanate 125 MG Oral Tablet 87

5-125 mg 

AMOXICILLIN/POTASSIUM CLAV 2021 12:00:00 AM EDT tablet       14           

             TAKE ONE 

TABLET BY MOUTH TWICE A DAY FOR 7 DAYS  TAKE ONE TABLET BY MOUTH TWICE A DAY FOR

 

7 DAYS       SOLD: 2021                                        [facility]

s

 

                          Amoxicillin 875 MG / Clavulanate 125 MG Oral Tablet Am

oxicillin/Clavulanate 

Potassium 08/10/2021 12:00:00 AM EDT             ORAL              active       

            [facility] ([facility])

 

        Prednisone 20 MG Oral Tablet Prednisone 08/10/2021 12:00:00 AM EDT      

           ORAL                    

active                                                          [facility] ([facility])

 

                    60 ACTUAT Albuterol 0.09 MG/ACTUAT Metered Dose Inhaler Albu

terol Sulfate HFA 

08/10/2021 12:00:00 AM EDT               RESPIRATORY               active       

               [facility] ([facility])

 

     Sutab Sutab 2021 12:00:00 AM EDT                          active     

           [facility] ([facility], )

 

                    Bisacodyl 5 MG Delayed Release Oral Tablet [Dulcolax] Dulcol

ax            2021 

12:00:00 AM EDT               ORAL                 active                      M

[facility] ([facility], 

)

 

                          pantoprazole 20 MG Delayed Release Oral Tablet [Proton

ix] Protonix 20 MG 

Protonix 20 MG 2020 12:00:00 AM EST        1.0 {tablet}                   

   active               

Protonix 20 MG                          eCW1 ([facility])

 

                          Citalopram 10 MG Oral Tablet Citalopram Hydrobromide 1

0 MG Citalopram 

Hydrobromide 10 MG 2020 12:00:00 AM EST        1.0 {tablet}               

       active               

Citalopram Hydrobromide 10 MG           eCW1 ([facility])

 

                          Citalopram 10 MG Oral Tablet Citalopram Hydrobromide 1

0 MG Citalopram 

Hydrobromide 10 MG 2020 12:00:00 AM EST        1.0 {tablet}               

       active               

Citalopram Hydrobromide 10 MG           eCW1 ([facility])

 

                          pantoprazole 20 MG Delayed Release Oral Tablet [Proton

ix] Protonix 20 MG 

Protonix 20 MG 2020 12:00:00 AM EST        1.0 {tablet}                   

   active               

Protonix 20 MG                          eCW1 ([facility])

 

                          pantoprazole 20 MG Delayed Release Oral Tablet [Proton

ix] Protonix 20 MG 

Protonix 20 MG 2020 12:00:00 AM EST        1.0 {tablet}                   

   active               

Protonix 20 MG                          eCW1 ([facility])

 

                          pantoprazole 20 MG Delayed Release Oral Tablet [Proton

ix] Protonix 20 MG 

Protonix 20 MG 2020 12:00:00 AM EST        1.0 {tablet}                   

   active               

Protonix 20 MG                          eCW1 ([facility])

 

                          Citalopram 10 MG Oral Tablet Citalopram Hydrobromide 1

0 MG Citalopram 

Hydrobromide 10 MG 2020 12:00:00 AM EST        1.0 {tablet}               

       active               

Citalopram Hydrobromide 10 MG           eCW1 ([facility])

 

                          Citalopram 10 MG Oral Tablet Citalopram Hydrobromide 1

0 MG Citalopram 

Hydrobromide 10 MG 2020 12:00:00 AM EST        1.0 {tablet}               

       active               

Citalopram Hydrobromide 10 MG           eCW1 ([facility])

 

                          pantoprazole 20 MG Delayed Release Oral Tablet [Proton

ix] Protonix 20 MG 

Protonix 20 MG 2020 12:00:00 AM EST        1.0 {tablet}                   

   active               

Protonix 20 MG                          eCW1 ([facility])

 

                          pantoprazole 20 MG Delayed Release Oral Tablet [Proton

ix] Protonix 20 MG 

Protonix 20 MG 2020 12:00:00 AM EST        1.0 {tablet}                   

   active               

Protonix 20 MG                          eCW1 ([facility])

 

                          pantoprazole 20 MG Delayed Release Oral Tablet [Proton

ix] Protonix 20 MG 

Protonix 20 MG 2020 12:00:00 AM EST        1.0 {tablet}                   

   active               

Protonix 20 MG                          eCW1 ([facility])

 

                          pantoprazole 20 MG Delayed Release Oral Tablet [Proton

ix] Protonix 20 MG 

Protonix 20 MG 2020 12:00:00 AM EST        1.0 {tablet}                   

   active               

Protonix 20 MG                          eCW1 ([facility])

 

                          Citalopram 10 MG Oral Tablet Citalopram Hydrobromide 1

0 MG Citalopram 

Hydrobromide 10 MG 2020 12:00:00 AM EST        1.0 {tablet}               

       active               

Citalopram Hydrobromide 10 MG           eCW1 ([facility])

 

                          Citalopram 10 MG Oral Tablet Citalopram Hydrobromide 1

0 MG Citalopram 

Hydrobromide 10 MG 2020 12:00:00 AM EST        1.0 {tablet}               

       active               

Citalopram Hydrobromide 10 MG           eCW1 ([facility])

 

                          Citalopram 10 MG Oral Tablet Citalopram Hydrobromide 1

0 MG Citalopram 

Hydrobromide 10 MG 2020 12:00:00 AM EST        1.0 {tablet}               

       active               

Citalopram Hydrobromide 10 MG           eCW1 ([facility])

 

                          Citalopram 10 MG Oral Tablet Citalopram Hydrobromide 1

0 MG Citalopram 

Hydrobromide 10 MG 2020 12:00:00 AM EST        1.0 {tablet}               

       active               

Citalopram Hydrobromide 10 MG           eCW1 ([facility])

 

                          doxycycline hyclate 100 MG Oral Capsule Doxycycline Hy

clate 100 MG Doxycycline 

Hyclate 100 MG 10/27/2020 12:00:00 AM EDT        1.0 {capsule}                  

    suspended               

Doxycycline Hyclate 100 MG              eCW1 ([facility])

 

                          doxycycline hyclate 100 MG Oral Capsule Doxycycline Hy

clate 100 MG Doxycycline 

Hyclate 100 MG 10/27/2020 12:00:00 AM EDT        1.0 {capsule}                  

    active               

Doxycycline Hyclate 100 MG              eCW1 ([facility])

 

                          Clindamycin 0.01 MG/MG Topical Gel Clindamycin Phospha

te 1 % Clindamycin 

Phosphate 1 % 10/27/2020 12:00:00 AM EDT        1.0 {application}               

       active               

Clindamycin Phosphate 1 %               eCW1 ([facility])

 

                          Clindamycin 0.01 MG/MG Topical Gel Clindamycin Phospha

te 1 % Clindamycin 

Phosphate 1 % 10/27/2020 12:00:00 AM EDT        1.0 {application}               

       active               

Clindamycin Phosphate 1 %               eCW1 ([facility])

 

                          Clindamycin 0.01 MG/MG Topical Gel Clindamycin Phospha

te 1 % Clindamycin 

Phosphate 1 % 10/27/2020 12:00:00 AM EDT        1.0 {application}               

       active               

Clindamycin Phosphate 1 %               eCW1 ([facility])

 

                          doxycycline hyclate 100 MG Oral Capsule Doxycycline Hy

clate 100 MG Doxycycline 

Hyclate 100 MG 10/27/2020 12:00:00 AM EDT        1.0 {capsule}                  

    suspended               

Doxycycline Hyclate 100 MG              eCW1 ([facility])

 

                          Clindamycin 0.01 MG/MG Topical Gel Clindamycin Phospha

te 1 % Clindamycin 

Phosphate 1 % 10/27/2020 12:00:00 AM EDT        1.0 {application}               

       active               

Clindamycin Phosphate 1 %               eCW1 ([facility])

 

                          doxycycline hyclate 100 MG Oral Capsule Doxycycline Hy

clate 100 MG Doxycycline 

Hyclate 100 MG 10/27/2020 12:00:00 AM EDT        1.0 {capsule}                  

    suspended               

Doxycycline Hyclate 100 MG              eCW1 ([facility])

 

                          Clindamycin 0.01 MG/MG Topical Gel Clindamycin Phospha

te 1 % Clindamycin 

Phosphate 1 % 10/27/2020 12:00:00 AM EDT        1.0 {application}               

       active               

Clindamycin Phosphate 1 %               eCW1 ([facility])

 

                          Clindamycin 0.01 MG/MG Topical Gel Clindamycin Phospha

te 1 % Clindamycin 

Phosphate 1 % 10/27/2020 12:00:00 AM EDT        1.0 {application}               

       active               

Clindamycin Phosphate 1 %               eCW1 ([facility])

 

                          Clindamycin 0.01 MG/MG Topical Gel Clindamycin Phospha

te 1 % Clindamycin 

Phosphate 1 % 10/27/2020 12:00:00 AM EDT        1.0 {application}               

       active               

Clindamycin Phosphate 1 %               eCW1 ([facility])

 

                          doxycycline hyclate 100 MG Oral Capsule Doxycycline Hy

clate 100 MG Doxycycline 

Hyclate 100 MG 10/27/2020 12:00:00 AM EDT        1.0 {capsule}                  

    suspended               

Doxycycline Hyclate 100 MG              eCW1 ([facility])

 

                          doxycycline hyclate 100 MG Oral Capsule Doxycycline Hy

clate 100 MG Doxycycline 

Hyclate 100 MG 10/27/2020 12:00:00 AM EDT        1.0 {capsule}                  

    suspended               

Doxycycline Hyclate 100 MG              eCW1 ([facility])

 

                          doxycycline hyclate 100 MG Oral Capsule Doxycycline Hy

clate 100 MG Doxycycline 

Hyclate 100 MG 10/27/2020 12:00:00 AM EDT        1.0 {capsule}                  

    suspended               

Doxycycline Hyclate 100 MG              eCW1 ([facility])

 

                          doxycycline hyclate 100 MG Oral Capsule Doxycycline Hy

clate 100 MG Doxycycline 

Hyclate 100 MG 10/27/2020 12:00:00 AM EDT        1.0 {capsule}                  

    suspended               

Doxycycline Hyclate 100 MG              eCW1 ([facility])

 

                          Clindamycin 0.01 MG/MG Topical Gel Clindamycin Phospha

te 1 % Clindamycin 

Phosphate 1 % 10/27/2020 12:00:00 AM EDT        1.0 {application}               

       active               

Clindamycin Phosphate 1 %               eCW1 ([facility])



                                                                                
                                                                                
                                                                                
                                                                                
                                                                                
                                      



Insurance Providers

          



             Payer name   Policy type / Coverage type Policy ID    Covered party

 ID Covered 

party's relationship to navarrete Policy Navarrete             Plan Information

 

          [facility]               [id number]            [facility]                 [id number]

 

          [facility]               [id number]                             [id number]

 

          SELF PAY            UNAVAILABLE           S                   UNAVAILA

BLE

 

                    [employer] [id number]        

                       

[id number]

 

                    [employer] [id number]        

                       

[id number]

 

                    [employer] [id number]        

                       

[id number]

 

                    [employer] [id number]        

                       

[id number]

 

                    [employer] [id number]        

                       

[id number]

 

                    [employer] [id number]        

                       

[id number]

 

                    [employer] [id number]        

                       

[id number]

 

                    [employer] [id number]        

                       

[id number]

 

                    [employer] [id number]        

                       

[id number]

 

                    [employer] [id number]        

                       

[id number]

 

                    [employer] [id number]        

                       

[id number]

 

                    [employer] [id number]        

                       

[id number]

 

          [facility]       O         [id number] S                   [id number]

 

          [employer] PPO O         [id number] S                   [id number]

 

          [employer]               [id number]           SP                  [id number]

 

                [employer]           [employer] Maintenance Organization (HMO) [id number] 

      

[id number]                                    

[id number]

 

          [employer]               [id number]           SP                  [id number]

 

          [facility]       CO        [id number]           [id number]                  [id number]

 

          SELF PAY            UNAVAILABLE           SP                  UNAVAILA

BLE

 

          [facility]           [id number]           SP               

   [id number]



                                                                                
                                                                                
                                                                                
                                                                    



Problems, Conditions, and Diagnoses

          



           Code       Display Name Description Problem Type Effective Dates Data

 Source(s)

 

             Z23          Encounter for immunization ENCOUNTER FOR IMMUNIZATION 

Diagnosis    2021 

11:29:00 AM [facility]

 

                    K21.9               [id number]           Gastroesophageal ref

lux disease, unspecified whether esophagitis

 present            Problem             2021 12:00:00 AM EST eCW1 ([facility])

 

           D23.9      [id number]  Dermatofibroma Problem    10/27/2020 12:00:00 A

M EDT eCW1 

([facility])

 

           L81.2      [id number]  Ephelides  Problem    10/27/2020 12:00:00 AM ED

T eCW1 ([facility])

 

           D18.01     [id number]    Cherry angioma Problem    10/27/2020 12:00:00 A

M EDT eCW1 ([facility])

 

             D22.4        [id number]    Melanocytic nevi of scalp and neck Proble

m      10/27/2020 12:00:00 

AM EDT                                  eCW1 ([facility])

 

           L73.9      [id number]   Folliculitis Problem    10/27/2020 12:00:00 AM 

EDT eCW1 ([facility])

 

           D22.5      [id number]  Melanocytic nevi of trunk Problem    10/27/2020

 12:00:00 AM EDT 

eCW1 ([facility])

 

                D22.61          [id number]       Melanocytic nevi of right upper 

limb, including shoulder 

Problem                   10/27/2020 12:00:00 AM EDT eCW1 ([facility])

 

                D22.62          [id number] Melanocytic nevi of left upper l

imb, including shoulder 

Problem                   10/27/2020 12:00:00 AM EDT eCW1 ([facility])

 

           L81.4      [id number]  Lentigines Problem    10/27/2020 12:00:00 AM ED

T eCW1 ([facility])

 

           L73.2      30906556   Hidradenitis suppurativa Problem    10/27/2020 

12:00:00 AM EDT eCW1 

([facility])

 

           L72.9      722483296  Cyst of skin Problem    10/27/2020 12:00:00 AM 

EDT eCW1 ([facility])

 

                D22.72          [id number] Melanocytic nevi of left lower l

imb, including hip 

Problem                   10/27/2020 12:00:00 AM EDT eCW1 ([facility])

 

             D22.71       [id number]    Melanocytic nevi of right lower limb, inc

luding hip Problem      

10/27/2020 12:00:00 AM EDT              eCW1 ([facility])



                                                                                
                                                                                
                                                                              



Surgeries/Procedures

          



             Procedure    Description  Date         Indications  Data Source(s)

 

             OFFICE OUTPATIENT NEW 30 MINUTES              08/10/2021 12:00:00 A

M EDT              [facility] 

([facility])

 

                    Imm: Flublok Quadrivalent 18 years & older 0.5mL IM Influenz

a                     2020 

12:00:00 AM EST                                     eCW1 ([facility]



                                                                                
                  



Results

          



                    ID                  Date                Data Source

 

                    [id number]         08/10/2021 12:00:00 AM EDT [facility]









          Name      Value     Range     Interpretation Code Description Data Eliza

rce(s) Supporting 

Document(s)

 

          SARS-CoV2 Rapid Antigen Negative                                [facility]

     

 

                                        This lab was reported by [facility]. 









                    ID                  Date                Data Source

 

                    [id number]      2021 12:00:00 AM EDT [facility]









          Name      Value     Range     Interpretation Code Description Data Eliza

rce(s) Supporting 

Document(s)

 

          SARS-CoV2 Rapid Antigen Negative                                [facility]

     

 

                                        This lab was ordered by [facility] an

d reported by [facility]. 









                    ID                  Date                Data Source

 

                    URINE CULTURE       2020 12:00:00 AM EST eCW1 ([facility])









          Name      Value     Range     Interpretation Code Description Data Eliza

rce(s) Supporting 

Document(s)

 

                                                  URINE CULTURE eCW1 ([facility])  







                                        Procedure

 

                                          



                                                                                
                                      



Social History

          



           Code       Duration   Value      Status     Description Data Source(s

)

 

           Smoking    2020 12:00:00 AM EST Current Smoker completed  Curre

nt Smoker eCW1 

([facility])

 

           Smoking    2020 12:00:00 AM EST Current Smoker completed  Curre

nt Smoker eCW1 

([facility])

 

           Smoking    2020 12:00:00 AM EST Current Smoker completed  Curre

nt Smoker eCW1 

([facility])

 

           Smoking    2020 12:00:00 AM EST Current Smoker completed  Curre

nt Smoker eCW1 

([facility])

 

           Smoking    2020 12:00:00 AM EST Current Smoker completed  Curre

nt Smoker eCW1 

([facility])

 

           Smoking    2020 12:00:00 AM EST Current Smoker completed  Curre

nt Smoker eCW1 

([facility])

 

           Smoking    2020 12:00:00 AM EST Current Smoker completed  Curre

nt Smoker eCW1 

([facility])

 

           Smoking    10/27/2020 12:00:00 AM EDT Current Smoker completed  Curre

nt Smoker eCW1 

([facility])



                                                                                
                                                                                
        



Vital Signs

          



                    ID                  Date                Data Source

 

                    UNK                                      









           Name       Value      Range      Interpretation Code Description Data

 Source(s)

 

           Systolic blood pressure 106 mm[Hg]                       106 mm[Hg] M

[facility] ([facility]

 [facility])

 

           Diastolic blood pressure 72 mm[Hg]                        72 mm[Hg]  

[facility] ([facility], 

[facility])

 

           Heart rate 68 /min                          68 /min    [facility] ([facility])

 

           Respiratory rate 18 /min                          18 /min    [facility] (

[facility])

 

           Oxygen saturation in Arterial blood by Pulse oximetry 98 %           

                  98 %       [facility] 

([facility])

 

           Body temperature 98.6 [degF]                       98.6 [degF] [facility]

 ([facility])

 

           Body weight 221.00 [lb_av]                       221.00 [lb_av] [facility]

T ([facility])

 

           Body height 69 [in_i]                        69 [in_i]  [facility] ([facility])

 

                                        5'9" 

 

           Body mass index (BMI) [Ratio] 32.6 kg/m2                       32.6 k

g/m2 [facility] ([facility])

 

           Body surface area Derived from formula 2.14 m2                       

   2.14 m2    [facility] ([facility])

 

           Systolic blood pressure 120 mm[Hg]                       120 mm[Hg] 

[facility]

 

           Diastolic blood pressure 82 mm[Hg]                        82 mm[Hg]  

[facility] ([facility])

 

           Body weight 218.00 [lb_av]                       218.00 [lb_av] [facility]

T ([facility])

 

           Body mass index (BMI) [Ratio] 32.2 kg/m2                       32.2 k

g/m2 [facility] ([facility])

 

           Ideal body weight 160 [lb_av]                       160 [facility]lb_av] [facility]

T ([facility])

 

           Body weight 98.885 kg                        98.885 kg  [facility] ([facility])

 

           Body height 69 [in_i]                        69 [in_i]  [facility] ([facility])

 

                                        5'9" 

 

           Body weight 221.0 [lb_av]                       221.0 [lb_av] eCW1 ([facility]

 

           Body height 69 [in_i]                        69 [in_i]  eCW1 ([facility])

 

           Body mass index (BMI) [Ratio] 32.63 kg/m2                       32.63

 kg/m2 W1 ([facility])

 

           Heart rate 93 /min                          93 /min    eCW1 ([facility])

 

           Respiratory rate 18 /min                          18 /min    eCW1 ([facility])

 

           Body temperature 98.2 [degF]                       98.2 [degF] eCW1 (

[facility])

 

           Systolic blood pressure 114 mm[Hg]                       114 mm[Hg] e

CW1 ([facility])

 

           Diastolic blood pressure 72 mm[Hg]                        72 mm[Hg]  

eCW1 ([facility])

 

           Body weight 214.8 [lb_av]                       214.8 [lb_av] eCW1 ([facility]

 

           Body height 69 [in_i]                        69 [in_i]  eCW1 ([facility])

 

           Body mass index (BMI) [Ratio] 31.72 kg/m2                       31.72

 kg/m2 eCW1 ([facility])

 

           Systolic blood pressure 122 mm[Hg]                       122 mm[Hg] e

CW1 ([facility])

 

           Diastolic blood pressure 74 mm[Hg]                        74 mm[Hg]  

eCW1 ([facility])



                                                                                
                  



Patient Treatment Plan of Care

          



             Planned Activity Planned Date Details      Description  Data Source

(s)

 

             Citalopram 10 MG Oral Tablet 2020 12:00:00 AM EST            

               eCW1 ([facility])

 

                          pantoprazole 20 MG Delayed Release Oral Tablet [Proton

ix] 2020 12:00:00 AM

 EST                                                        eCW1 ([facility])

 

             Citalopram 10 MG Oral Tablet 2020 12:00:00 AM EST            

               eCW1 ([facility])

 

                          pantoprazole 20 MG Delayed Release Oral Tablet [Proton

ix] 2020 12:00:00 AM

 EST                                                        eCW1 ([facility])

 

             Citalopram 10 MG Oral Tablet 2020 12:00:00 AM EST            

               eCW1 ([facility])

 

                          pantoprazole 20 MG Delayed Release Oral Tablet [Proton

ix] 2020 12:00:00 AM

 EST                                                        eCW1 ([facility])

 

                          pantoprazole 20 MG Delayed Release Oral Tablet [Proton

ix] 2020 12:00:00 AM

 EST                                                        eCW1 ([facility])

 

                          pantoprazole 20 MG Delayed Release Oral Tablet [Proton

ix] 2020 12:00:00 AM

 EST                                                        eCW1 ([facility])

 

             Citalopram 10 MG Oral Tablet 2020 12:00:00 AM EST            

               eCW1 ([facility])

 

             Clindamycin 0.01 MG/MG Topical Gel 10/27/2020 12:00:00 AM EDT      

                     eCW1 

([facility])

 

             doxycycline hyclate 100 MG Oral Capsule 10/27/2020 12:00:00 AM EDT 

                          eCW1 

([facility])

## 2021-11-04 ENCOUNTER — HOSPITAL ENCOUNTER (OUTPATIENT)
Dept: HOSPITAL 53 - M LABSMTC | Age: 41
End: 2021-11-04
Attending: ANESTHESIOLOGY
Payer: COMMERCIAL

## 2021-11-04 DIAGNOSIS — Z11.52: ICD-10-CM

## 2021-11-04 DIAGNOSIS — Z01.818: Primary | ICD-10-CM

## 2021-11-09 ENCOUNTER — HOSPITAL ENCOUNTER (OUTPATIENT)
Dept: HOSPITAL 53 - M OPP | Age: 41
Discharge: HOME | End: 2021-11-09
Attending: INTERNAL MEDICINE
Payer: COMMERCIAL

## 2021-11-09 VITALS — DIASTOLIC BLOOD PRESSURE: 69 MMHG | SYSTOLIC BLOOD PRESSURE: 110 MMHG

## 2021-11-09 VITALS — HEIGHT: 69 IN | WEIGHT: 201.8 LBS | BODY MASS INDEX: 29.89 KG/M2

## 2021-11-09 DIAGNOSIS — K64.8: ICD-10-CM

## 2021-11-09 DIAGNOSIS — K57.30: ICD-10-CM

## 2021-11-09 DIAGNOSIS — Z86.010: ICD-10-CM

## 2021-11-09 DIAGNOSIS — K22.89: ICD-10-CM

## 2021-11-09 DIAGNOSIS — K29.80: ICD-10-CM

## 2021-11-09 DIAGNOSIS — R12: ICD-10-CM

## 2021-11-09 DIAGNOSIS — Z12.11: Primary | ICD-10-CM

## 2021-11-09 DIAGNOSIS — K29.70: ICD-10-CM

## 2021-11-09 DIAGNOSIS — Z79.899: ICD-10-CM

## 2021-11-09 DIAGNOSIS — Z80.0: ICD-10-CM

## 2021-11-09 PROCEDURE — 45378 DIAGNOSTIC COLONOSCOPY: CPT

## 2021-11-09 PROCEDURE — 88305 TISSUE EXAM BY PATHOLOGIST: CPT

## 2021-11-09 PROCEDURE — 43239 EGD BIOPSY SINGLE/MULTIPLE: CPT

## 2021-11-09 NOTE — CCD
Continuity of Care Document (CCD)

                             Created on: 10/18/2021



Maverick Dee

External Reference #: [id number]

: 1980

Sex: Male



Demographics





                          Address                   [address] [place]  [address]

 

                          Home Phone                [phone]

 

                          Preferred Language        Unknown

 

                          Marital Status            Never 

 

                          Adventist Affiliation     Unknown

 

                          Race                      White

 

                          Ethnic Group              Not  or 





Author





                          Author                    Maverick RICO PA-C

 

                          Organization              Unknown

 

                          Address                   [address] [place] [address]  [address]



 

                          Phone                     [phone]







Care Team Providers





                    Care Team Member Name Role                Phone

 

                    [facility] AUTM                [phone] 







Problems





                                        Description

 

                                        No Information Available







Social History





                Type            Date            Description     Comments

 

                Birth Sex                       Unknown          

 

                Tobacco Use     Start: Unknown  Patient is a current smoker, smo

kes some days  

 

                Tobacco Use     Start: Unknown  Light tobacco smoker (10 or fewe

r cigarettes/day)  







Allergies and adverse reactions





                                        Description

 

                                        No Known Drug Allergies







Medications





           Active Medications SIG        Qnty       Indications Ordering Provide

r Date

 

                Citalopram Hydrobromide                     10mg Tablets        

                                            

                          Unknown                   







Immunizations





                                        Description

 

                                        No Information Available







Vital Signs





                Date            Vital           Result          Comment

 

                10/18/2021  9:04am BP Systolic     112 mmHg         

 

                    BP Diastolic        67 mmHg              

 

                    Heart Rate          56 /min              

 

                    Body Temperature    97.7 F             

 

                    Respiratory Rate    16 /min              

 

                    O2 % BldC Oximetry  97 %                 







Results





                                        Description

 

                                        No Information Available







Procedures





                Date            Code            Description     Status

 

                10/18/2021      16504           Office/Outpatient [facility] 15-

29 Minutes Completed

 

                10/18/2021      86282           Pulse Oximetry Single Determinat

ion Completed







Medical Devices





                                        Description

 

                                        No Information Available







Encounters





           Type       Date       Location   Provider   Dx         Diagnosis

 

           Office Visit 10/18/2021  8:[facility]a [facility] Deysi Rico PA-C M7

5.91     

Shoulder lesion, unspecified, right shoulder







Assessments





                Date            Code            Description     Provider

 

                10/18/2021      M75.91          Shoulder lesion, unspecified, ri

ght shoulder Deysi Rico PA-C







Plan of Treatment





No Information Available



Functional Status





                                        Description

 

                                        No Information Available







Mental Status





                                        Description

 

                                        No Information Available







Referrals





                                        Description

 

                                        No Information Available

## 2021-11-09 NOTE — CCD
Summarization Of Episode

                             Created on: 2021



CHRISTINA KEYES

External Reference #: [id number]

: 1980

Sex: Undifferentiated



Demographics





                          Address                   [address] [place]  [address]

 

                          Home Phone                [phone]

 

                          Preferred Language        English

 

                          Marital Status            Unknown

 

                          Alevism Affiliation     Unknown

 

                          Race                      Unknown

 

                          Ethnic Group              Not  or 





Author





                          Author                    [facility]

 

                          Organization              [facility]

 

                          Address                   Unknown

 

                          Phone                     Unavailable







Support





                Name            Relationship    Address         Phone

 

                    INDRVCTL            Next Of [facility] [address]

[place]  [address]                 [phone]

 

                    [place]  [address]                  [phone]

 

                    [facility] Next Of Kin         [address] [facility] 

[address]

[place]  [address]                 [phone]

 

                    SHARAD ADAMS         Next Of [facility]         [address] [place]  [address]                  [phone]

 

                    INDIANRIV*          Next Of Kin         [id number] [place] [address] [address]

[place]  [address]                 [phone]

 

                ST              Next Of Kin     Unknown         Unavailable

 

                    POONAM KEYES    Next Of Kin         [address] [place]  [address]                  [phone]

 

                    MARILEE KEYES   Next Of Kin         [address] [place]  [address]                      [phone]

 

                    Sharad Adams         ECON                [address] [place]  [address]                  [phone]







Care Team Providers





                    Care Team Member Name Role                Phone

 

                    LADAN HARPM PA-C Unavailable         Unavailable

 

                    DJOUINI,  ZAK PA-C Unavailable         Unavailable

 

                    DJOUINI,  ZAK PA-C Unavailable         Unavailable

 

                    DJOUINI,  ZAK PA-C Unavailable         Unavailable

 

                    DJOUINI,  ZAK PA-C Unavailable         Unavailable

 

                    DJOUINI,  ZAK PA-C Unavailable         Unavailable

 

                    RUTHANNRAMYAGABRIELA PA Unavailable         Unavailable

 

                    RUTHANN,  GABRIELA PA Unavailable         Unavailable

 

                    RUTHANN,  GABRIELA PA Unavailable         Unavailable

 

                    RUTHANN,  GABRIELA PA Unavailable         Unavailable

 

                    RUTHANN,  GABRIELA PA Unavailable         Unavailable

 

                    RUTHANN,  GABRIELA PA Unavailable         Unavailable

 

                    RUTHANN,  GABRIELA PA Unavailable         Unavailable

 

                    RUTHANN,  GABRIELA PA Unavailable         Unavailable

 

                    RUTHANN,  GABRIELA PA Unavailable         Unavailable

 

                    RUTHANN,  GABRIELA PA Unavailable         Unavailable

 

                    RUTHANN,  GABRIELA PA Unavailable         Unavailable

 

                    RUTHANN,  GABRIELA PA Unavailable         Unavailable

 

                    RUTHANN,  GABRIELA PA Unavailable         Unavailable

 

                    RUTHANN,  GABRIELA PA Unavailable         Unavailable

 

                    RUTHANN,  GABRIELA PA Unavailable         Unavailable

 

                    RUTHANN,  GABRIELA PA Unavailable         Unavailable

 

                    RUTHANN,  GABRIELA PA Unavailable         Unavailable

 

                    RUTHANN,  GABRIELA PA Unavailable         Unavailable

 

                    RUTHANN,  GABRIELA PA Unavailable         Unavailable

 

                    RUTHANN,  GABRIELA PA Unavailable         Unavailable

 

                    RUTHANN,  GABRIELA PA Unavailable         Unavailable

 

                    RUTHANN,  GABRIELA PA Unavailable         Unavailable

 

                    RUTHANN,  GABRIELA PA Unavailable         Unavailable

 

                    RUTHANN,  GABRIELA PA Unavailable         Unavailable

 

                    RUTHANN,  GABRIELA PA Unavailable         Unavailable

 

                    URTHANN,  GABRIELA PA Unavailable         Unavailable

 

                    RUTHANN,  GABRIELA PA Unavailable         Unavailable

 

                    RUTHANN,  GABRIELA PA Unavailable         Unavailable

 

                    RUTHANN,  GABRIELA PA Unavailable         Unavailable

 

                    RUTHANN,  GABRIELA PA Unavailable         Unavailable

 

                    RUTHANN,  GABRIELA PA Unavailable         Unavailable

 

                    RUTHANN,  GABRIELA PA Unavailable         Unavailable

 

                    RUTHANN,  GABRIELA PA Unavailable         Unavailable

 

                    RUTHANN,  GABRIELA PA Unavailable         Unavailable

 

                    RUTHANN,  GABRIELA PA Unavailable         Unavailable

 

                    RUTHANN,  GABRIELA PA Unavailable         Unavailable

 

                    Rydberg,  Leyda PA Unavailable         Unavailable

 

                    Rydberg,  Leyda PA Unavailable         Unavailable

 

                    Rydberg,  Leyda PA Unavailable         Unavailable

 

                    Rydberg,  Leyda PA Unavailable         Unavailable

 

                    Rydberg,  Leyda PA Unavailable         Unavailable

 

                    Rydberg,  Leyda PA Unavailable         Unavailable

 

                    Rydberg,  Leyda PA Unavailable         Unavailable

 

                    Rydberg,  Leyda PA Unavailable         Unavailable

 

                    Rydberg,  Leyda PA Unavailable         Unavailable

 

                    Rydberg,  Leyda PA Unavailable         Unavailable

 

                    Rydberg,  Leyda PA Unavailable         Unavailable

 

                    Rydberg,  Leyda PA Unavailable         Unavailable

 

                    Rydberg,  Leyda PA Unavailable         Unavailable

 

                    Rydberg,  Leyda PA Unavailable         Unavailable

 

                    Rydberg,  Leyda PA Unavailable         Unavailable

 

                    Rydberg,  Leyda PA Unavailable         Unavailable

 

                    Rydberg,  Leyda PA Unavailable         Unavailable

 

                    Rydberg,  Leyda PA Unavailable         Unavailable

 

                    Rydberg,  Leyda PA Unavailable         Unavailable

 

                    Rydberg,  Leyda PA Unavailable         Unavailable

 

                    Rydberg,  Leyda PA Unavailable         Unavailable

 

                    Rydberg,  Leyda PA Unavailable         Unavailable

 

                    Rydberg,  Leyda PA Unavailable         Unavailable

 

                    DJOUINI,  ZAK PA-C Unavailable         Unavailable

 

                    DJOUINI,  ZAK PA-C Unavailable         Unavailable

 

                    DJOUINI,  ZAK PA-C Unavailable         Unavailable

 

                    DJOUINI,  ZAK PA-C Unavailable         Unavailable

 

                    DJOUINI,  ZAK PA-C Unavailable         Unavailable

 

                    DJOUINI,  ZAK PA-C Unavailable         Unavailable

 

                    DJOUINI,  ZAK PA-C Unavailable         Unavailable



                                  



Re-disclosure Warning

          The records that you are about to access may contain information from 
federally-assisted alcohol or drug abuse programs. If such information is 
present, then the following federally mandated warning applies: This information
has been disclosed to you from records protected by federal confidentiality 
rules (42 CFR part 2). The federal rules prohibit you from making any further 
disclosure of this information unless further disclosure is expressly permitted 
by the written consent of the person to whom it pertains or as otherwise 
permitted by 42 CFR part 2. A general authorization for the release of medical 
or other information is NOT sufficient for this purpose. The Federal rules 
restrict any use of the information to criminally investigate or prosecute any 
alcohol or drug abuse patient.The records that you are about to access may 
contain highly sensitive health information, the redisclosure of which is 
protected by Article 27-F of the [facility] Public Health law. If you 
continue you may have access to information: Regarding HIV / AIDS; Provided by 
facilities licensed or operated by the [facility] Office of Mental Health; 
or Provided by the [facility] Office for People With Developmental 
Disabilities. If such information is present, then the following [place] 
mandated warning applies: This information has been disclosed to you from 
confidential records which are protected by state law. State law prohibits you 
from making any further disclosure of this information without the specific 
written consent of the person to whom it pertains, or as otherwise permitted by 
law. Any unauthorized further disclosure in violation of state law may result in
a fine or senior care sentence or both. A general authorization for the release of 
medical or other information is NOT sufficient authorization for further disc
losure.                                                                         
    



Family History

          



             Family Member Name Family Member Gender Family Member Status Date o

f Status 

Description                             Data Source(s)

 

           Unknown    Unknown    Problem                          [facility]



                                                                                
       



Encounters

          



           Encounter  Providers  Location   Date       Indications Data Source(s

)

 

                Outpatient      Attender: ZAK HARP PA-C Family Practice 10

/ 08:50:00 AM 

EDT                                                 [facility]

[facility])

 

                Outpatient      Attender: ZAK HARP PA-C                 10

/ 08:49:00 AM EDT - 

10/18/2021 08:49:00 AM EDT                           [facility]

 

                Unknown                         [address]

[place] [address] 2021 12:00:00 AM 

EDT                                                 [facility]

[facility])

 

                Outpatient      Attender: GABRIELA cook 08/10/2021 

09:10:00 AM EDT                                     MEDENT ([facility]

e, [facility])

 

                Unknown                         [address]

[place] [address] 2021 12:00:00 AM 

EDT                                                 [facility]

[facility])

 

                Unknown                         [address]

[place] [address] 2021 12:00:00 AM 

EDT                                                 [facility]

[facility])

 

                Unknown                         [address]

[place] [address] 2021 12:00:00 AM 

EDT                                                 [facility]

[facility])

 

           Outpatient Attender: Leyda FERNANDEZ            2021 11:29:00

 AM [facility]

 

           Outpatient Attender: Leyda FERNANDEZ            2021 01:15:00

 PM [facility]

 

                                        Admission cancelled. Disregard status an

d admitted date. 

 

                Unknown                         [address]

[place] [address] 2020 12:00:00 AM 

EST                                                 eCW1 ([facility])

 

                Outpatient                      [address]

[place] [address] 2020 12:00:00 AM

EST                                                 [facility]

[facility])

 

                Unknown                         [address]

[place] [address] 2020 12:00:00 AM 

EST                                                 [facility]

[facility])

 

                Outpatient                      [address]

[place] [address] 10/27/2020 12:00:00 AM

EDT                                                 [facility]

[facility])



                                                                                
                                                                                
                                              



Immunizations

          



             Vaccine      Date         Status       Description  Data Source(s)

 

             COVID-19 VACCINE Moderna 2021 12:00:00 AM EST completed      

           [facility]

 

                                        Vaccine Series Complete: YESThis Data wa

s Submitted to [facility] Via [employer]. 

 

             COVID-19 VACCINE Moderna 2021 12:00:00 AM EST completed      

           [facility]

 

                                        Vaccine Series Complete: NOThis Data was

 Submitted to [facility] [employer]. 

 

                          influenza, recombinant, quadrIvalent,injectable, prese

rvative free 2020 

05:06:00 PM EST     completed                               eCW1 ([facility])

 

                          influenza, recombinant, quadrIvalent,injectable, prese

rvative free 2020 

05:06:00 PM EST     completed                               eCW1 ([facility])

 

                          influenza, recombinant, quadrIvalent,injectable, prese

rvative free 2020 

05:06:00 PM EST     completed                               eCW1 ([facility])

 

                          influenza, recombinant, quadrIvalent,injectable, prese

rvative free 2020 

05:06:00 PM EST     completed                               eCW1 ([facility])

 

                          influenza, recombinant, quadrIvalent,injectable, prese

rvative free 2020 

05:06:00 PM EST     completed                               eCW1 ([facility])

 

                          influenza, recombinant, quadrIvalent,injectable, prese

rvative free 2020 

05:06:00 PM EST     completed                               eCW1 ([facility])

 

                          influenza, recombinant, quadrIvalent,injectable, prese

rvative free 2020 

05:06:00 PM EST     completed                               eCW1 ([facility])



                                                                                
                                                                                
      



Medications

          



          Medication Brand Name Start Date Product Form Dose      Route     Admi

nistrative 

Instructions Pharmacy Instructions Status     Indications Reaction   Description

 Data 

Source(s)

 

                          Amoxicillin 875 MG / Clavulanate 125 MG Oral Tablet 87

5-125 mg 

AMOXICILLIN/POTASSIUM CLAV 2021 12:00:00 AM EDT tablet       14           

             TAKE ONE 

TABLET BY MOUTH TWICE A DAY FOR 7 DAYS  TAKE ONE TABLET BY MOUTH TWICE A DAY FOR

 

7 DAYS       SOLD: 2021                                        [facility]

s

 

                          Amoxicillin 875 MG / Clavulanate 125 MG Oral Tablet Am

oxicillin/Clavulanate 

Potassium 08/10/2021 12:00:00 AM EDT             ORAL              active       

            MEDENT ([facility])

 

        Prednisone 20 MG Oral Tablet Prednisone 08/10/2021 12:00:00 AM EDT      

           ORAL                    

active                                                          MEDENT ([facility])

 

                    60 ACTUAT Albuterol 0.09 MG/ACTUAT Metered Dose Inhaler Albu

terol Sulfate HFA 

08/10/2021 12:00:00 AM EDT               RESPIRATORY               active       

               MEDENT ([facility])

 

     Sutab Sutab 2021 12:00:00 AM EDT                          active     

           MEDENT ([facility], )

 

                    Bisacodyl 5 MG Delayed Release Oral Tablet [Dulcolax] Dulcol

ax            2021 

12:00:00 AM EDT               ORAL                 active                      M

EDENT ([facility], 

)

 

                          pantoprazole 20 MG Delayed Release Oral Tablet [Proton

ix] Protonix 20 MG 

Protonix 20 MG 2020 12:00:00 AM EST        1.0 {tablet}                   

   active               

Protonix 20 MG                          eCW1 ([facility])

 

                          Citalopram 10 MG Oral Tablet Citalopram Hydrobromide 1

0 MG Citalopram 

Hydrobromide 10 MG 2020 12:00:00 AM EST        1.0 {tablet}               

       active               

Citalopram Hydrobromide 10 MG           eCW1 ([facility])

 

                          Citalopram 10 MG Oral Tablet Citalopram Hydrobromide 1

0 MG Citalopram 

Hydrobromide 10 MG 2020 12:00:00 AM EST        1.0 {tablet}               

       active               

Citalopram Hydrobromide 10 MG           eCW1 ([facility])

 

                          pantoprazole 20 MG Delayed Release Oral Tablet [Proton

ix] Protonix 20 MG 

Protonix 20 MG 2020 12:00:00 AM EST        1.0 {tablet}                   

   active               

Protonix 20 MG                          eCW1 ([facility])

 

                          pantoprazole 20 MG Delayed Release Oral Tablet [Proton

ix] Protonix 20 MG 

Protonix 20 MG 2020 12:00:00 AM EST        1.0 {tablet}                   

   active               

Protonix 20 MG                          eCW1 ([facility])

 

                          pantoprazole 20 MG Delayed Release Oral Tablet [Proton

ix] Protonix 20 MG 

Protonix 20 MG 2020 12:00:00 AM EST        1.0 {tablet}                   

   active               

Protonix 20 MG                          eCW1 ([facility])

 

                          Citalopram 10 MG Oral Tablet Citalopram Hydrobromide 1

0 MG Citalopram 

Hydrobromide 10 MG 2020 12:00:00 AM EST        1.0 {tablet}               

       active               

Citalopram Hydrobromide 10 MG           eCW1 ([facility])

 

                          Citalopram 10 MG Oral Tablet Citalopram Hydrobromide 1

0 MG Citalopram 

Hydrobromide 10 MG 2020 12:00:00 AM EST        1.0 {tablet}               

       active               

Citalopram Hydrobromide 10 MG           eCW1 ([facility])

 

                          pantoprazole 20 MG Delayed Release Oral Tablet [Proton

ix] Protonix 20 MG 

Protonix 20 MG 2020 12:00:00 AM EST        1.0 {tablet}                   

   active               

Protonix 20 MG                          eCW1 ([facility])

 

                          pantoprazole 20 MG Delayed Release Oral Tablet [Proton

ix] Protonix 20 MG 

Protonix 20 MG 2020 12:00:00 AM EST        1.0 {tablet}                   

   active               

Protonix 20 MG                          eCW1 ([facility])

 

                          pantoprazole 20 MG Delayed Release Oral Tablet [Proton

ix] Protonix 20 MG 

Protonix 20 MG 2020 12:00:00 AM EST        1.0 {tablet}                   

   active               

Protonix 20 MG                          eCW1 ([facility])

 

                          pantoprazole 20 MG Delayed Release Oral Tablet [Proton

ix] Protonix 20 MG 

Protonix 20 MG 2020 12:00:00 AM EST        1.0 {tablet}                   

   active               

Protonix 20 MG                          eCW1 ([facility])

 

                          Citalopram 10 MG Oral Tablet Citalopram Hydrobromide 1

0 MG Citalopram 

Hydrobromide 10 MG 2020 12:00:00 AM EST        1.0 {tablet}               

       active               

Citalopram Hydrobromide 10 MG           eCW1 ([facility])

 

                          Citalopram 10 MG Oral Tablet Citalopram Hydrobromide 1

0 MG Citalopram 

Hydrobromide 10 MG 2020 12:00:00 AM EST        1.0 {tablet}               

       active               

Citalopram Hydrobromide 10 MG           eCW1 ([facility])

 

                          Citalopram 10 MG Oral Tablet Citalopram Hydrobromide 1

0 MG Citalopram 

Hydrobromide 10 MG 2020 12:00:00 AM EST        1.0 {tablet}               

       active               

Citalopram Hydrobromide 10 MG           eCW1 ([facility])

 

                          Citalopram 10 MG Oral Tablet Citalopram Hydrobromide 1

0 MG Citalopram 

Hydrobromide 10 MG 2020 12:00:00 AM EST        1.0 {tablet}               

       active               

Citalopram Hydrobromide 10 MG           eCW1 ([facility])

 

                          doxycycline hyclate 100 MG Oral Capsule Doxycycline Hy

clate 100 MG Doxycycline 

Hyclate 100 MG 10/27/2020 12:00:00 AM EDT        1.0 {capsule}                  

    suspended               

Doxycycline Hyclate 100 MG              eCW1 ([facility])

 

                          doxycycline hyclate 100 MG Oral Capsule Doxycycline Hy

clate 100 MG Doxycycline 

Hyclate 100 MG 10/27/2020 12:00:00 AM EDT        1.0 {capsule}                  

    active               

Doxycycline Hyclate 100 MG              eCW1 ([facility])

 

                          Clindamycin 0.01 MG/MG Topical Gel Clindamycin Phospha

te 1 % Clindamycin 

Phosphate 1 % 10/27/2020 12:00:00 AM EDT        1.0 {application}               

       active               

Clindamycin Phosphate 1 %               eCW1 ([facility])

 

                          Clindamycin 0.01 MG/MG Topical Gel Clindamycin Phospha

te 1 % Clindamycin 

Phosphate 1 % 10/27/2020 12:00:00 AM EDT        1.0 {application}               

       active               

Clindamycin Phosphate 1 %               eCW1 ([facility])

 

                          Clindamycin 0.01 MG/MG Topical Gel Clindamycin Phospha

te 1 % Clindamycin 

Phosphate 1 % 10/27/2020 12:00:00 AM EDT        1.0 {application}               

       active               

Clindamycin Phosphate 1 %               eCW1 ([facility])

 

                          doxycycline hyclate 100 MG Oral Capsule Doxycycline Hy

clate 100 MG Doxycycline 

Hyclate 100 MG 10/27/2020 12:00:00 AM EDT        1.0 {capsule}                  

    suspended               

Doxycycline Hyclate 100 MG              eCW1 ([facility])

 

                          Clindamycin 0.01 MG/MG Topical Gel Clindamycin Phospha

te 1 % Clindamycin 

Phosphate 1 % 10/27/2020 12:00:00 AM EDT        1.0 {application}               

       active               

Clindamycin Phosphate 1 %               eCW1 ([facility])

 

                          doxycycline hyclate 100 MG Oral Capsule Doxycycline Hy

clate 100 MG Doxycycline 

Hyclate 100 MG 10/27/2020 12:00:00 AM EDT        1.0 {capsule}                  

    suspended               

Doxycycline Hyclate 100 MG              eCW1 ([facility])

 

                          Clindamycin 0.01 MG/MG Topical Gel Clindamycin Phospha

te 1 % Clindamycin 

Phosphate 1 % 10/27/2020 12:00:00 AM EDT        1.0 {application}               

       active               

Clindamycin Phosphate 1 %               eCW1 ([facility])

 

                          Clindamycin 0.01 MG/MG Topical Gel Clindamycin Phospha

te 1 % Clindamycin 

Phosphate 1 % 10/27/2020 12:00:00 AM EDT        1.0 {application}               

       active               

Clindamycin Phosphate 1 %               eCW1 ([facility])

 

                          Clindamycin 0.01 MG/MG Topical Gel Clindamycin Phospha

te 1 % Clindamycin 

Phosphate 1 % 10/27/2020 12:00:00 AM EDT        1.0 {application}               

       active               

Clindamycin Phosphate 1 %               eCW1 ([facility])

 

                          doxycycline hyclate 100 MG Oral Capsule Doxycycline Hy

clate 100 MG Doxycycline 

Hyclate 100 MG 10/27/2020 12:00:00 AM EDT        1.0 {capsule}                  

    suspended               

Doxycycline Hyclate 100 MG              eCW1 ([facility])

 

                          doxycycline hyclate 100 MG Oral Capsule Doxycycline Hy

clate 100 MG Doxycycline 

Hyclate 100 MG 10/27/2020 12:00:00 AM EDT        1.0 {capsule}                  

    suspended               

Doxycycline Hyclate 100 MG              eCW1 ([facility])

 

                          doxycycline hyclate 100 MG Oral Capsule Doxycycline Hy

clate 100 MG Doxycycline 

Hyclate 100 MG 10/27/2020 12:00:00 AM EDT        1.0 {capsule}                  

    suspended               

Doxycycline Hyclate 100 MG              eCW1 ([facility])

 

                          doxycycline hyclate 100 MG Oral Capsule Doxycycline Hy

clate 100 MG Doxycycline 

Hyclate 100 MG 10/27/2020 12:00:00 AM EDT        1.0 {capsule}                  

    suspended               

Doxycycline Hyclate 100 MG              eCW1 ([facility])

 

                          Clindamycin 0.01 MG/MG Topical Gel Clindamycin Phospha

te 1 % Clindamycin 

Phosphate 1 % 10/27/2020 12:00:00 AM EDT        1.0 {application}               

       active               

Clindamycin Phosphate 1 %               eCW1 ([facility])



                                                                                
                                                                                
                                                                                
                                                                                
                                                                                
                                      



Insurance Providers

          



             Payer name   Policy type / Coverage type Policy ID    Covered party

 ID Covered 

party's relationship to navarrete Policy [employer]             Plan Information

 

          [employer]               [id number]            MO2                 [id number]

 

          [employer]               [id number]           SP                  [id number]

 

          [facility]           [id number]           SP               

   [id number]

 

          SELF PAY            UNAVAILABLE           S                   UNAVAILA

BLE

 

                    [employer] [id number]        

                       

[id number]

 

                    [employer] [id number]        

                       

[id number]

 

                    [employer] [id number]        

                       

[id number]

 

                    [employer] [id number]        

                       

[id number]

 

                    [employer] [id number]        

                       

[id number]

 

                    [employer] [id number]        

                       

[id number]

 

                    [employer] [id number]        

                       

[id number]

 

                    [employer] [id number]        

                       

[id number]

 

                    [employer] [id number]        

                       

[id number]

 

                    [employer] [id number]        

                       

[id number]

 

                    [employer] [id number]        

                       

[id number]

 

                    [employer] [id number]        

                       

[id number]

 

          [employer]       O         [id number] S                   [id number]

 

          [employer] PPO O         [id number] S                   [id number]

 

          [employer]               [id number]           SP                  [id number]

 

                [employer]           [employer] (HMO) [id number] 

      

[id number]                                    

[id number]

 

          [employer]               [id number]           SP                  [id number]

 

          [facility]           [id number]           SP               

   [id number]

 

          SELF PAY            UNAVAILABLE           SP                  UNAVAILA

BLE

 

          [employer]       CO        [id number]           [id number]                  [id number]



                                                                                
                                                                                
                                                                                
                                                                              



Problems, Conditions, and Diagnoses

          



           Code       Display Name Description Problem Type Effective Dates Data

 Source(s)

 

           R200       Anesthesia of skin Anesthesia of skin Diagnosis  10/18/202

1 08:49:00 AM EDT 

[facility]

 

             Z23          Encounter for immunization ENCOUNTER FOR IMMUNIZATION 

Diagnosis    2021 

11:29:00 AM [facility]

 

                    K21.9               [id number]           Gastroesophageal ref

lux disease, unspecified whether esophagitis

 present            Problem             2021 12:00:00 AM [facility] ([facility])

 

           D23.9      [id number]  Dermatofibroma Problem    10/27/2020 12:00:00 A

M EDT eCW1 

([facility])

 

           L81.2      [id number]  Ephelides  Problem    10/27/2020 12:00:00 AM ED

T eCW1 ([facility])

 

           D18.01     [id number]    Cherry angioma Problem    10/27/2020 12:00:00 A

M EDT eCW1 ([facility])

 

             D22.4        [id number]    Melanocytic nevi of scalp and neck Proble

m      10/27/2020 12:00:00 

AM EDT                                  eCW1 ([facility])

 

           L73.9      [id number]   Folliculitis Problem    10/27/2020 12:00:00 AM 

EDT eCW1 ([facility])

 

           D22.5      [id number]  Melanocytic nevi of trunk Problem    10/27/2020

 12:00:00 AM EDT 

eCW1 ([facility])

 

                D22.61          [id number]       Melanocytic nevi of right upper 

limb, including shoulder 

Problem                   10/27/2020 12:00:00 AM EDT eCW1 ([facility])

 

                D22.62          [id number] Melanocytic nevi of left upper l

imb, including shoulder 

Problem                   10/27/2020 12:00:00 AM EDT eCW1 ([facility])

 

           L81.4      [id number]  Lentigines Problem    10/27/2020 12:00:00 AM ED

T eCW1 ([facility])

 

           L73.2      [id number]   Hidradenitis suppurativa Problem    10/27/2020 

12:00:00 AM EDT eCW1 

([facility])

 

           L72.9      [id number]  Cyst of skin Problem    10/27/2020 12:00:00 AM 

EDT eCW1 ([facility])

 

                D22.72          [id number] Melanocytic nevi of left lower l

imb, including hip 

Problem                   10/27/2020 12:00:00 AM EDT eCW1 ([facility])

 

             D22.71       [id number]    Melanocytic nevi of right lower limb, inc

luding hip Problem      

10/27/2020 12:00:00 AM EDT              eCW1 ([facility])



                                                                                
                                                                                
                                                                                
        



Surgeries/Procedures

          



             Procedure    Description  Date         Indications  Data Source(s)

 

             Pulse Oximetry Single Determination              10/18/2021 12:00:0

0 AM EDT              [facility] 

([facility])

 

             OFFICE OUTPATIENT NEW 20 MINUTES              10/18/2021 12:00:00 A

M EDT              [facility] ([facility])

 

             OFFICE OUTPATIENT NEW 30 MINUTES              08/10/2021 12:00:00 A

M EDT              [facility] 

([facility])

 

                    Imm: Flublok Quadrivalent 18 years & older 0.5mL IM Influenz

a                     2020 

12:00:00 AM EST                                     eCW1 ([facility])



                                                                                
                                      



Results

          



                    ID                  Date                Data Source

 

                    [id number]         08/10/2021 12:00:00 AM EDT [facility]









          Name      Value     Range     Interpretation Code Description Data Eliza

rce(s) Supporting 

Document(s)

 

          SARS-CoV2 Rapid Antigen Negative                                [facility]

     

 

                                        This lab was reported by [facility]. 









                    ID                  Date                Data Source

 

                    [id number]      2021 12:00:00 AM EDT [facility]









          Name      Value     Range     Interpretation Code Description Data Eliza

rce(s) Supporting 

Document(s)

 

          SARS-CoV2 Rapid Antigen Negative                                [facility]

     

 

                                        This lab was ordered by [facility] an

d reported by [facility]. 









                    ID                  Date                Data Source

 

                    URINE CULTURE       2020 12:00:00 AM EST eCW1 ([facility])









          Name      Value     Range     Interpretation Code Description Data Eliza

rce(s) Supporting 

Document(s)

 

                                                  URINE CULTURE eCW1 ([facility])  







                                        Procedure

 

                                          



                                                                                
                                      



Social History

          



           Code       Duration   Value      Status     Description Data Source(s

)

 

           Smoking    2020 12:00:00 AM EST Current Smoker completed  Curre

nt Smoker eCW1 

([facility])

 

           Smoking    2020 12:00:00 AM EST Current Smoker completed  [facility]re

nt Smoker eCW1 

([facility])

 

           Smoking    2020 12:00:00 AM EST Current Smoker completed  [facility]re

nt Smoker eCW1 

([facility])

 

           Smoking    2020 12:00:00 AM EST Current Smoker completed  Curre

nt Smoker eCW1 

([facility])

 

           Smoking    2020 12:00:00 AM EST Current Smoker completed  Curre

nt Smoker eCW1 

([facility])

 

           Smoking    2020 12:00:00 AM EST Current Smoker completed  Curre

nt Smoker eCW1 

([facility])

 

           Smoking    2020 12:00:00 AM EST Current Smoker completed  Curre

nt Smoker eCW1 

([facility])

 

           Smoking    10/27/2020 12:00:00 AM EDT Current Smoker completed  Curre

nt Smoker eCW1 

([facility])



                                                                                
                                                                                
        



Vital Signs

          



                    ID                  Date                Data Source

 

                    UNK                                      









           Name       Value      Range      Interpretation Code Description Data

 Source(s)

 

           Systolic blood pressure 112 mm[Hg]                       112 mm[Hg] M

EDENT ([facility])

 

           Diastolic blood pressure 67 mm[Hg]                        67 mm[Hg]  

MED[facility] ([facility])

 

           [facility] 56 /min                          56 /[facility]

 

           Body temperature 97.7 [degF]                       97.7 [degF] MED[facility]

 ([facility])

 

           Respiratory rate 16 /min                          16 /min    [facility]

[facility]

 

           Oxygen saturation in Arterial blood by Pulse oximetry 97 %           

                  97 %       MED[facility] 

([facility])

 

           Systolic blood pressure 106 mm[Hg]                       106 mm[Hg] M

EDENT ([facility],

 [facility])

 

           Diastolic blood pressure 72 mm[Hg]                        72 mm[Hg]  

[facility] ([facility])

 

           Heart rate 68 /min                          68 /min    MEDENT ([facility])

 

           Respiratory rate 18 /min                          18 /min    [facility]

 

           Oxygen saturation in Arterial blood by Pulse oximetry 98 %           

                  98 %       [facility] 

([facility])

 

           Body temperature 98.6 [degF]                       98.6 [degF] MEDENT

 ([facility], 

[facility])

 

           Body weight 221.00 [lb_av]                       221.00 [lb_av] MEDEN

T ([facility], 

[facility])

 

           Body height 69 [in_i]                        69 [in_i]  MEDENT ([facility])

 

                                        5'9" 

 

           Body mass index (BMI) [Ratio] 32.6 kg/m2                       32.6 k

g/m2 [facility]

 

           Body surface area Derived from formula 2.14 m2                       

   2.14 m2    MEDENT ([facility])

 

           Systolic blood pressure 120 mm[Hg]                       120 mm[Hg] M

EDENT ([facility])

 

           Body weight 218.00 [lb_av]                       218.00 [lb_av] MEDEN

T ([facility])

 

           Body mass index (BMI) [Ratio] 32.2 kg/m2                       32.2 k

g/m2 MEDENT ([facility])

 

           Ideal body weight 160 [lb_av]                       160 [lb_av] MEDEN

T ([facility])

 

           Body weight 98.885 kg                        98.885 kg  [facility] ([facility])

 

           Body height 69 [in_i]                        69 [in_i]  [facility] ([facility])

 

                                        5'9" 

 

           Diastolic blood pressure 82 mm[Hg]                        82 mm[Hg]  

[facility] ([facility])

 

           Body weight 221.0 [lb_av]                       221.0 [lb_av] eCW1 ([facility])

 

           Body height 69 [in_i]                        69 [in_i]  eCW1 ([facility])

 

           Body mass index (BMI) [Ratio] 32.63 kg/m2                       32.63

 kg/m2 eCW1 ([facility])

 

           Heart rate 93 /min                          93 /min    [facility]CW[facility]

 

           Respiratory rate 18 /min                          18 /min    [facility]CW1 ([facility]

 

           Body temperature 98.2 [degF]                       98.2 [degF] eCW1 (

[facility])

 

           Systolic blood pressure 114 mm[Hg]                       114 mm[Hg] e

CW1 ([facility])

 

           Diastolic blood pressure 72 mm[Hg]                        72 mm[Hg]  

eCW1 ([facility])

 

           Body weight 214.8 [lb_av]                       214.8 [lb_av] eCW1 ([facility])

 

           Body height 69 [in_i]                        69 [in_i]  eCW1 ([facility])

 

           Body mass index (BMI) [Ratio] 31.72 kg/m2                       31.72

 kg/m2 eCW1 ([facility])

 

           Systolic blood pressure 122 mm[Hg]                       122 mm[Hg] e

CW1 ([facility])

 

           Diastolic blood pressure 74 mm[Hg]                        74 mm[Hg]  

eCW1 ([facility])



                                                                                
                  



Patient Treatment Plan of Care

          



             Planned Activity Planned Date Details      Description  Data Source

(s)

 

             Citalopram 10 MG Oral Tablet 2020 12:00:00 AM EST            

               eCW1 ([facility])

 

                          pantoprazole 20 MG Delayed Release Oral Tablet [Proton

ix] 2020 12:00:00 AM

 EST                                                        eCW1 ([facility])

 

             Citalopram 10 MG Oral Tablet 2020 12:00:00 AM EST            

               eCW1 ([facility])

 

                          pantoprazole 20 MG Delayed Release Oral Tablet [Proton

ix] 2020 12:00:00 AM

 EST                                                        eCW1 ([facility])

 

             Citalopram 10 MG Oral Tablet 2020 12:00:00 AM EST            

               eCW1 ([facility])

 

                          pantoprazole 20 MG Delayed Release Oral Tablet [Proton

ix] 2020 12:00:00 AM

 EST                                                        eCW1 ([facility])

 

                          pantoprazole 20 MG Delayed Release Oral Tablet [Proton

ix] 2020 12:00:00 AM

 EST                                                        eCW1 ([facility])

 

                          pantoprazole 20 MG Delayed Release Oral Tablet [Proton

ix] 2020 12:00:00 AM

 EST                                                        eCW1 ([facility])

 

             Citalopram 10 MG Oral Tablet 2020 12:00:00 AM EST            

               eCW1 ([facility])

 

             Clindamycin 0.01 MG/MG Topical Gel 10/27/2020 12:00:00 AM EDT      

                     eCW1 

([facility])

 

             doxycycline hyclate 100 MG Oral Capsule 10/27/2020 12:00:00 AM EDT 

                          eCW1 

([facility])

## 2021-11-09 NOTE — ROOR
________________________________________________________________________________

Patient Name: Maverick Dee               Procedure Date: 11/9/2021 10:02 AM

MRN: [id number]                          Account Number: [id number]

YOB: 1980               Age: 41

Room: [facility]                            Gender: Male

Note Status: Finalized                 

________________________________________________________________________________

 

Procedure:            Colonoscopy

Indications:          High risk colon cancer surveillance: Personal history of 

                      colonic polyps, Family history of colon cancer in a 

                      first-degree relative before age 60 years

Providers:            Rusty Araujo MD

Referring MD:         JAE HARTLEY [facility] Sujata

Requesting Provider:  

Medicines:            Monitored Anesthesia Care

Complications:        No immediate complications.

________________________________________________________________________________

Procedure:            Pre-Anesthesia Assessment:

                      - The heart rate, respiratory rate, oxygen saturations, 

                      blood pressure, adequacy of pulmonary ventilation, and 

                      response to care were monitored throughout the procedure.

                      The Colonoscope was introduced through the anus and 

                      advanced to the terminal ileum, with identification of 

                      the appendiceal orifice and IC valve. The colonoscopy 

                      was performed without difficulty. The patient tolerated 

                      the procedure well. The quality of the bowel preparation 

                      was good.

                                                                                

Findings:

     The perianal and digital rectal examinations were normal.

     Mild sigmoid diverticulosis and small internal hemorrhoids.

     The entire examined colon appeared normal on direct and retroflexion 

     views.

     The terminal ileum appeared normal.

                                                                                

Impression:           - Mild sigmoid diverticulosis and small internal 

                      hemorrhoids.

                      - The colon is otherwise normal on direct and 

                      retroflexion views.

                      - The examined portion of the ileum was normal.

                      - No specimens collected.

Recommendation:       - Repeat colonoscopy in 5 years for screening purposes.

                                                                                

Procedure Code(s):    --- Professional ---

                      37480, Colonoscopy, flexible; diagnostic, including 

                      collection of specimen(s) by brushing or washing, when 

                      performed (separate procedure)

Diagnosis Code(s):    --- Professional ---

                      Z80.0, Family history of malignant neoplasm of digestive 

                      organs

                      Z86.010, Personal history of colonic polyps

 

CPT copyright 2019 [employer]. All rights reserved.

 

The codes documented in this report are preliminary and upon  review may 

be revised to meet current compliance requirements.

 

Rusty Araujo MD

________________

Rusty Araujo MD

11/9/2021 10:35:09 AM

Electronically signed by Rusty Araujo MD

Number of Addenda: 0

 

Note Initiated On: 11/9/2021 10:02 AM

Estimated Blood Loss: Estimated blood loss: none.

## 2021-11-09 NOTE — ROOR
________________________________________________________________________________

Patient Name: Maverick Dee               Procedure Date: 11/9/2021 10:02 AM

MRN: [id number]                          Account Number: [id number]

YOB: 1980               Age: 41

Room: [facility]                            Gender: Male

Note Status: Finalized                 

________________________________________________________________________________

 

Procedure:            Upper GI endoscopy

Indications:          Heartburn

Providers:            Rusty Araujo MD

Referring MD:         JAE HARTLEY [facility] JAE Ernst

Requesting Provider:  

Medicines:            Monitored Anesthesia Care

Complications:        No immediate complications.

________________________________________________________________________________

Procedure:            Pre-Anesthesia Assessment:

                      - The heart rate, respiratory rate, oxygen saturations, 

                      blood pressure, adequacy of pulmonary ventilation, and 

                      response to care were monitored throughout the procedure.

                      The Endoscope was introduced through the mouth, and 

                      advanced to the second part of duodenum. The upper GI 

                      endoscopy was accomplished without difficulty. The 

                      patient tolerated the procedure well.

                                                                                

Findings:

     The Z-line was variable and was found 38 cm from the incisors. Biopsies 

     were taken with a cold forceps for histology.

     Localized mild inflammation characterized by erythema was found in the 

     gastric antrum. Biopsies were taken with a cold forceps for histology.

     Scattered mild inflammation characterized by erythema and granularity was 

     found in the second portion of the duodenum. Biopsies were taken with a 

     cold forceps for histology.

     There is no endoscopic evidence of a significant hiatal hernia in the 

     cardia on this exam.

                                                                                

Impression:           - Z-line variable, 38 cm from the incisors. Biopsied.

                      - Mild Gastritis. Biopsied.

                      - Mild Duodenitis. Biopsied.

Recommendation:       - Use Protonix (pantoprazole) 40 mg PO daily.

                      - No ibuprofen, naproxen, or other non-steroidal 

                      anti-inflammatory drugs.

                      - Follow an antireflux regimen.

                      - Telephone endoscopist for pathology results in 2 weeks.

                                                                                

Procedure Code(s):    --- Professional ---

                      10743, Esophagogastroduodenoscopy, flexible, transoral; 

                      with biopsy, single or multiple

Diagnosis Code(s):    --- Professional ---

                      R12, Heartburn

                      K29.80, Duodenitis without bleeding

                      K29.70, Gastritis, unspecified, without bleeding

                      K22.8, Other specified diseases of esophagus

 

CPT copyright 2019 [employer]. All rights reserved.

 

The codes documented in this report are preliminary and upon  review may 

be revised to meet current compliance requirements.

 

Rusty Araujo MD

________________

Rusty Araujo MD

11/9/2021 10:22:54 AM

Electronically signed by Rusty Araujo MD

Number of Addenda: 0

 

Note Initiated On: 11/9/2021 10:02 AM

Estimated Blood Loss: Estimated blood loss: none.

## 2023-05-26 ENCOUNTER — HOSPITAL ENCOUNTER (OUTPATIENT)
Dept: HOSPITAL 53 - M PLALAB | Age: 43
End: 2023-05-26
Attending: FAMILY MEDICINE
Payer: COMMERCIAL

## 2023-05-26 DIAGNOSIS — Z01.84: Primary | ICD-10-CM

## 2023-05-26 LAB
ALBUMIN SERPL BCG-MCNC: 4.1 G/DL (ref 3.2–5.2)
ALP SERPL-CCNC: 64 U/L (ref 46–116)
ALT SERPL W P-5'-P-CCNC: 22 U/L (ref 7–40)
AST SERPL-CCNC: 12 U/L (ref ?–34)
BILIRUB SERPL-MCNC: 0.3 MG/DL (ref 0.3–1.2)
BUN SERPL-MCNC: 20 MG/DL (ref 9–23)
CALCIUM SERPL-MCNC: 8.8 MG/DL (ref 8.5–10.1)
CHLORIDE SERPL-SCNC: 106 MMOL/L (ref 98–107)
CHOLEST SERPL-MCNC: 173 MG/DL (ref ?–200)
CHOLEST/HDLC SERPL: 2.47 {RATIO} (ref ?–5)
CO2 SERPL-SCNC: 30 MMOL/L (ref 20–31)
CREAT SERPL-MCNC: 1 MG/DL (ref 0.7–1.3)
EST. AVERAGE GLUCOSE BLD GHB EST-MCNC: 105 MG/DL (ref 60–110)
GFR SERPL CREATININE-BSD FRML MDRD: > 60 ML/MIN/{1.73_M2} (ref 60–?)
GLUCOSE SERPL-MCNC: 69 MG/DL (ref 60–100)
HDLC SERPL-MCNC: 70 MG/DL (ref 40–?)
LDLC SERPL CALC-MCNC: 88.8 MG/DL (ref ?–100)
NONHDLC SERPL-MCNC: 103 MG/DL
POTASSIUM SERPL-SCNC: 4.2 MMOL/L (ref 3.5–5.1)
PROT SERPL-MCNC: 6.4 G/DL (ref 5.7–8.2)
SODIUM SERPL-SCNC: 142 MMOL/L (ref 136–145)
T4 FREE SERPL-MCNC: 1.25 NG/DL (ref 0.89–1.76)
TRIGL SERPL-MCNC: 71 MG/DL (ref ?–150)
TSH SERPL DL<=0.005 MIU/L-ACNC: 1.36 UIU/ML (ref 0.55–4.78)

## 2023-05-30 LAB
MEV IGG SER IA-ACNC: 60 AU/ML
VZV IGG SER IA-ACNC: 618 INDEX

## 2024-08-08 ENCOUNTER — HOSPITAL ENCOUNTER (OUTPATIENT)
Dept: HOSPITAL 53 - M PLALAB | Age: 44
End: 2024-08-08
Attending: FAMILY MEDICINE
Payer: COMMERCIAL

## 2024-08-08 DIAGNOSIS — Z13.6: Primary | ICD-10-CM

## 2024-08-08 LAB
BUN SERPL-MCNC: 16 MG/DL (ref 9–23)
CALCIUM SERPL-MCNC: 9.4 MG/DL (ref 8.5–10.1)
CHLORIDE SERPL-SCNC: 104 MMOL/L (ref 98–107)
CHOLEST SERPL-MCNC: 208 MG/DL (ref ?–200)
CHOLEST/HDLC SERPL: 2.66 {RATIO} (ref ?–5)
CO2 SERPL-SCNC: 30 MMOL/L (ref 20–31)
CREAT SERPL-MCNC: 0.92 MG/DL (ref 0.7–1.3)
EST. AVERAGE GLUCOSE BLD GHB EST-MCNC: 105 MG/DL (ref 60–110)
GFR SERPL CREATININE-BSD FRML MDRD: > 60 ML/MIN/{1.73_M2} (ref 60–?)
GLUCOSE SERPL-MCNC: 86 MG/DL (ref 60–100)
HDLC SERPL-MCNC: 78.1 MG/DL (ref 40–?)
LDLC SERPL CALC-MCNC: 114.5 MG/DL (ref ?–100)
NONHDLC SERPL-MCNC: 129.9 MG/DL
POTASSIUM SERPL-SCNC: 4.4 MMOL/L (ref 3.5–5.1)
SODIUM SERPL-SCNC: 139 MMOL/L (ref 136–145)
TRIGL SERPL-MCNC: 77 MG/DL (ref ?–150)

## 2025-03-18 ENCOUNTER — HOSPITAL ENCOUNTER (OUTPATIENT)
Dept: HOSPITAL 53 - M RAD | Age: 45
End: 2025-03-18
Attending: PHYSICIAN ASSISTANT
Payer: COMMERCIAL

## 2025-03-18 DIAGNOSIS — Z15.09: Primary | ICD-10-CM

## 2025-04-08 ENCOUNTER — HOSPITAL ENCOUNTER (OUTPATIENT)
Dept: HOSPITAL 53 - M SFHCPLAZ | Age: 45
End: 2025-04-08
Attending: FAMILY MEDICINE
Payer: COMMERCIAL

## 2025-04-08 DIAGNOSIS — R82.90: Primary | ICD-10-CM

## 2025-04-08 LAB
APPEARANCE UR: CLEAR
BACTERIA UR QL AUTO: NEGATIVE
BILIRUB UR QL STRIP.AUTO: NEGATIVE
GLUCOSE UR QL STRIP.AUTO: NEGATIVE MG/DL
HGB UR QL STRIP.AUTO: NEGATIVE
KETONES UR QL STRIP.AUTO: NEGATIVE MG/DL
LEUKOCYTE ESTERASE UR QL STRIP.AUTO: NEGATIVE
NITRITE UR QL STRIP.AUTO: NEGATIVE
PROT UR QL STRIP.AUTO: NEGATIVE MG/DL
RBC # UR AUTO: 0 /HPF (ref 0–3)
SP GR UR STRIP.AUTO: 1.01 (ref 1–1.03)
SQUAMOUS #/AREA URNS AUTO: 0 /HPF (ref 0–6)
UROBILINOGEN UR QL STRIP.AUTO: 0.2 MG/DL (ref 0–2)
WBC #/AREA URNS AUTO: 0 /HPF (ref 0–3)

## 2025-04-28 ENCOUNTER — HOSPITAL ENCOUNTER (OUTPATIENT)
Dept: HOSPITAL 53 - M OPP | Age: 45
Discharge: HOME | End: 2025-04-28
Attending: INTERNAL MEDICINE
Payer: COMMERCIAL

## 2025-04-28 VITALS — TEMPERATURE: 97.3 F

## 2025-04-28 VITALS — OXYGEN SATURATION: 96 % | DIASTOLIC BLOOD PRESSURE: 72 MMHG | SYSTOLIC BLOOD PRESSURE: 131 MMHG

## 2025-04-28 VITALS — BODY MASS INDEX: 33.33 KG/M2 | HEIGHT: 69 IN | WEIGHT: 225 LBS

## 2025-04-28 DIAGNOSIS — Z86.0100: ICD-10-CM

## 2025-04-28 DIAGNOSIS — K63.5: Primary | ICD-10-CM

## 2025-04-28 DIAGNOSIS — Z85.038: ICD-10-CM

## 2025-04-28 DIAGNOSIS — Z87.891: ICD-10-CM

## 2025-04-28 DIAGNOSIS — Z79.899: ICD-10-CM

## 2025-04-28 DIAGNOSIS — K64.8: ICD-10-CM

## 2025-04-28 DIAGNOSIS — K44.9: ICD-10-CM

## 2025-04-28 DIAGNOSIS — K57.30: ICD-10-CM

## 2025-04-28 DIAGNOSIS — Z15.09: ICD-10-CM

## 2025-04-28 PROCEDURE — 43235 EGD DIAGNOSTIC BRUSH WASH: CPT

## 2025-04-28 PROCEDURE — 45385 COLONOSCOPY W/LESION REMOVAL: CPT

## 2025-04-28 PROCEDURE — 88305 TISSUE EXAM BY PATHOLOGIST: CPT
